# Patient Record
Sex: MALE | Race: WHITE | Employment: FULL TIME | URBAN - METROPOLITAN AREA
[De-identification: names, ages, dates, MRNs, and addresses within clinical notes are randomized per-mention and may not be internally consistent; named-entity substitution may affect disease eponyms.]

---

## 2020-03-12 ENCOUNTER — NURSE TRIAGE (OUTPATIENT)
Dept: NURSING | Facility: CLINIC | Age: 30
End: 2020-03-12

## 2020-03-12 ENCOUNTER — VIRTUAL VISIT (OUTPATIENT)
Dept: FAMILY MEDICINE | Facility: OTHER | Age: 30
End: 2020-03-12

## 2020-03-12 NOTE — TELEPHONE ENCOUNTER
Angelo is calling about the drive up carona virus test.  All last week had a fever and cold and dry cough.  Angelo is working and is wanting to know if he should quarantine himself.      Additional Information    Negative: Severe difficulty breathing (e.g., struggling for each breath, speaks in single words)    Negative: Bluish (or gray) lips or face now    Negative: [1] Rapid onset of cough AND [2] has hives    Negative: Coughing started suddenly after medicine, an allergic food or bee sting    Negative: [1] Difficulty breathing AND [2] exposure to flames, smoke, or fumes    Negative: [1] Stridor AND [2] difficulty breathing    Negative: Sounds like a life-threatening emergency to the triager    Negative: Patient sounds very sick or weak to the triager    Negative: [1] Coughed up blood AND [2] > 1 tablespoon (15 ml) (Exception: blood-tinged sputum)    Negative: Fever > 103 F (39.4 C)    Negative: [1] Fever > 101 F (38.3 C) AND [2] age > 60    Negative: [1] Fever > 100.0 F (37.8 C) AND [2] bedridden (e.g., nursing home patient, CVA, chronic illness, recovering from surgery)    Negative: [1] Fever > 100.0 F (37.8 C) AND [2] diabetes mellitus or weak immune system (e.g., HIV positive, cancer chemo, splenectomy, chronic steroids)    Negative: Wheezing is present    Negative: [1] Ankle swelling AND [2] swelling is increasing    Negative: SEVERE coughing spells (e.g., whooping sound after coughing, vomiting after coughing)    Negative: [1] Continuous (nonstop) coughing interferes with work or school AND [2] no improvement using cough treatment per protocol    Negative: Fever present > 3 days (72 hours)    Negative: [1] Fever returns after gone for over 24 hours AND [2] symptoms worse or not improved    Negative: [1] Using nasal washes and pain medicine > 24 hours AND [2] sinus pain (around cheekbone or eye) persists    Negative: Earache is present    Negative: Cough has been present for > 3 weeks    Negative: [1] Nasal  discharge AND [2] present > 10 days    Negative: [1] Coughed up blood-tinged sputum AND [2] more than once    Negative: [1] Patient also has allergy symptoms (e.g., itchy eyes, clear nasal discharge, postnasal drip) AND [2] they are acting up    Negative: Taking an ACE Inhibitor medication  (e.g., benazepril/LOTENSIN, captopril/CAPOTEN, enalapril/VASOTEC, lisinopril/ZESTRIL)    Negative: Exposure to TB (Tuberculosis)    Cough    Negative: Severe difficulty breathing (e.g., struggling for each breath, speak in single words, bluish lips)    Negative: Sounds like a life-threatening emergency to the triager    Negative: [1] Difficulty breathing (shortness of breath) occurs AND [2] onset > 14 days after CORONAVIRUS EXPOSURE (Close Contact)    Negative: [1] Dry cough occurs AND [2] onset > 14 days after CORONAVIRUS EXPOSURE    Negative: [1] Wet cough (i.e., white-yellow, yellow, green, or yovany colored sputum) AND [2] onset > 14 days after CORONAVIRUS EXPOSURE    Negative: [1] Common cold symptoms AND [2] onset > 14 days after CORONAVIRUS EXPOSURE    Negative: [1] Difficulty breathing occurs AND [2] within 14 days of CORONAVIRUS EXPOSURE    Negative: Patient sounds very sick or weak to the triager    Negative: [1] Fever or feeling feverish AND [2] within 14 Days of CORONAVIRUS EXPOSURE    Negative: [1] Cough occurs AND [2] within 14 days of CORONAVIRUS EXPOSURE    Negative: [1] Fever or feeling feverish AND [2] symptoms of lower respiratory illness (e.g., cough, difficulty breathing) AND [3] TRAVEL FROM [place] within last 14 days    Negative: [1] Body aches, chills, diarrhea, headache, runny nose, or sore throat occur AND [2] within 14 days of CORONAVIRUS EXPOSURE    Negative: [1] CORONAVIRUS EXPOSURE 15 or more days ago AND [2] NO cough or fever or breathing difficulty    Negative: [1] No CORONAVIRUS EXPOSURE BUT [2] questions about    Protocols used: COUGH - ACUTE NON-PRODUCTIVE-A-AH, CORONAVIRUS (2019-NCOV)  EXPOSURE-A-AH

## 2020-03-16 NOTE — PROGRESS NOTES
"Date: 2020 10:21:59  Clinician: Neyda Hargrove  Clinician NPI: [id number]  Patient: Angelo Alan  Patient : 1990  Patient Address: [address] [place] [id number]  Patient Phone: [phone]  Visit Protocol: URI  Patient Summary:  Angelo is a 30 year old ( : 1990 ) male who initiated a Visit for COVID-19 (Coronavirus) evaluation and screening. When asked the question \"Please sign me up to receive news, health information and promotions. \", Angelo responded \"No\".    Angelo states his symptoms started gradually 10-13 days ago. After his symptoms started, they improved and then got worse again.   His symptoms consist of rhinitis, malaise, a sore throat, a cough, nasal congestion, a headache, and myalgia. He is experiencing difficulty breathing due to nasal congestion but he is not short of breath.   Symptom details     Nasal secretions: The color of his mucus is green, clear, yellow, and white.    Cough: Angelo coughs every 5-10 minutes and his cough is more bothersome at night. Phlegm does not come into his throat when he coughs. He does not believe his cough is caused by post-nasal drip.     Sore throat: Angelo reports having mild throat pain (1-3 on a 10 point pain scale), does not have exudate on his tonsils, and can swallow liquids. He is not sure if the lymph nodes in his neck are enlarged. A rash has not appeared on the skin since the sore throat started.     Headache: He states the headache is mild (1-3 on a 10 point pain scale).      Angelo denies having wheezing, ear pain, fever, teeth pain, chills, and facial pain or pressure. He also denies taking antibiotic medication for the symptoms, having recent facial or sinus surgery in the past 60 days, and having a sinus infection within the past year.   Precipitating events  Angelo is not sure if he has been exposed to someone with strep throat. He has recently been exposed to someone with influenza. Angelo has been in close contact with the " following high risk individuals: children under the age of 5.   Pertinent COVID-19 (Coronavirus) information  Angelo has not traveled internationally in the last 14 days before the start of his symptoms.   Angelo has not had close contact with a laboratory confirmed positive COVID-19 patient within 14 days of symptom onset.   Pertinent medical history  Angelo does not need a return to work/school note.   Weight: 200 lbs   Angelo does not smoke or use smokeless tobacco.   Weight: 200 lbs    MEDICATIONS: Adderall XR oral, ALLERGIES: NKDA  Clinician Response:  Dear Angelo,  Based on the information provided, you have acute bacterial sinusitis, also known as a sinus infection. Sinus infections are caused by bacteria or a virus and symptoms are almost always identical. The difference between the 2 types of infections is timing.  Sinus infections start as viral infections and symptoms improve on their own in about 7 days. If symptoms have not improved after 7 days or have even worsened, a bacterial infection may have developed.  Medication information  I am prescribing:     Amoxicillin-pot clavulanate 875-125 mg oral tablet. Take 1 tablet by mouth every 12 hours for 10 days. There are no refills with this prescription.   Self care  The following tips will keep you as comfortable as possible while you recover:     Rest    Drink plenty of water and other liquids    Take a hot shower to loosen congestion    Use throat lozenges    Gargle with warm salt water (1/4 teaspoon of salt per 8 ounce glass of water)    Suck on frozen items such as popsicles or ice cubes    Drink hot tea with lemon and honey    Take a spoonful of honey to reduce your cough     When to seek care  Please be seen in a clinic or urgent care if any of the following occur:     Symptoms do not start to improve after 3 days of treatment    New symptoms develop, or symptoms become worse     It is possible to have an allergic reaction to an antibiotic even if you have not  had one in the past. If you notice a new rash, significant swelling, or difficulty breathing, stop taking this medication immediately and go to a clinic or urgent care.  Call 911 or go to the emergency room if you feel that your throat is closing off, you suddenly develop a rash, you are unable to swallow fluids, you are drooling, or you are having difficulty breathing.  COVID-19 (Coronavirus) General Information  We understand it may be concerning to be ill with symptoms that overlap with COVID-19 (Coronavirus) symptoms. Below are some helpful information on COVID-19 (Coronavirus).  How can I protect myself and others from the COVID-19 (Coronavirus)?  Because there is currently no vaccine to prevent infection, the best way to protect yourself is to avoid being exposed to this virus. The CDC recommends the following additional steps:     Wash your hands often with soap and water for at least 20 seconds, especially after blowing your nose, coughing, or sneezing; going to the bathroom; and before eating or preparing food.  Use an alcohol-based hand  that contains at least 60 percent alcohol if soap and water are not available.        Avoid touching your eyes, nose and mouth with unwashed hands.    Avoid close contact with people who are sick.    Stay home when you are sick.    Cover your cough or sneeze with a tissue, then throw the tissue in the trash.    Clean and disinfect frequently touched objects and surfaces.     You can help stop COVID-19 (Coronavirus) by knowing the signs and symptoms:     Fever    Cough    Shortness of breath     Contact your healthcare provider if   Develop symptoms   AND   Have been in close contact with a person known to have COVID-19 (Coronavirus) or live in or have recently traveled from an area with ongoing spread of COVID-19 (Coronavirus). Call ahead before you go to a doctor's office or emergency room. Tell them about your recent travel and your symptoms.   For the most up  to date information, visit the CDC's website.  Steps to help prevent the spread of COVID-19 (Coronavirus) if you are sick  If you are sick with COVID-19 (Coronavirus) or suspect you are infected with the virus that causes COVID-19 (Coronavirus), follow the steps below to help prevent the disease from spreading&nbsp;to people in your home and community.     Stay home except to get medical care. Home isolation may be started in consultation with your healthcare clinician.    Separate yourself from other people and animals in your home.    Call ahead before visiting your doctor if you have a medical appointment.    Wear a facemask when you are around other people.    Cover your cough and sneezes.    Clean your hands often.    Avoid sharing personal household items.    Clean and disinfect frequently touched objects and surfaces everyday.    You will need to have someone drop off medications or household supplies (if needed) at your house without coming inside or in contact with you or others living in your house.    Monitor your symptoms and seek prompt medical care if your illness is worsening (e.g. Difficulty breathing).    Discontinue home isolation only in consultation with your healthcare provider.     For more detailed and up to date information on what to do if you are sick, visit this link: What to Do If You Are Sick With Coronavirus Disease 2019 (COVID-19).  Do I need to be tested for COVID-19 (Coronavirus)?     At this time, the limited number of tests available are controlled by the state and local health departments and are being reserved for more seriously ill patients, those with known exposure to confirmed patients, and those with recent travel (within 14 days) to countries with high rates of COVID-19 (Coronavirus).    Decisions on which patients receive testing will be based on the local spread of COVID-19 (Coronavirus) as well as the symptoms. Your healthcare provider will make the final decision on  "whether you should be tested.    In the meantime, if you have concerns that you may have been exposed, it is reasonable to practice \"social distancing.\"&nbsp; If you are ill with a cold or flu like illness, please monitor your symptoms and reach out to your healthcare provider if your symptoms worsen.    For more up to date information, visit this link: COVID-19 (Coronavirus) Frequently Asked Questions and Answers.      Diagnosis: Acute bacterial sinusitis  Diagnosis ICD: J01.90  Prescription: amoxicillin-pot clavulanate 875-125 mg oral tablet 20 tablet, 10 days supply. Take 1 tablet by mouth every 12 hours for 10 days. Refills: 0, Refill as needed: no, Allow substitutions: yes  "

## 2021-02-06 ENCOUNTER — NURSE TRIAGE (OUTPATIENT)
Dept: NURSING | Facility: CLINIC | Age: 31
End: 2021-02-06

## 2021-02-06 NOTE — TELEPHONE ENCOUNTER
Triage Call:    Pt calling with report of current oral fever of 103.1  Says he has had a fever and chills for 1 week, once up to 102.5 orally.   Pt also says he currently has body aches, fatigue, and a frequent non-productive cough.  Denies breathing problems or chest pains.    Pt says he had a covid test 1 week ago that was negative.     Triaged to disposition of See Physician within 24 hours, and care advice given. Pt said he will go to the Urgent Care tomorrow.      COVID 19 Nurse Triage Plan/Patient Instructions    Please be aware that novel coronavirus (COVID-19) may be circulating in the community. If you develop symptoms such as fever, cough, or SOB or if you have concerns about the presence of another infection including coronavirus (COVID-19), please contact your health care provider or visit [email or web address].     Disposition/Instructions    In-Person Visit with provider recommended. Reference Visit Selection Guide.    Thank you for taking steps to prevent the spread of this virus.  o Limit your contact with others.  o Wear a simple mask to cover your cough.  o Wash your hands well and often.    Resources    [facility]: About COVID-19: [email or web address]    CDC: What to Do If You're Sick: www.cdc.gov/coronavirus/2019-ncov/about/steps-when-sick.html    CDC: Ending Home Isolation: www.cdc.gov/coronavirus/2019-ncov/hcp/disposition-in-home-patients.html     CDC: Caring for Someone: www.cdc.gov/coronavirus/2019-ncov/if-you-are-sick/care-for-someone.html     [facility]: Interim Guidance for Hospital Discharge to Home: www.health.[facility].[place]/diseases/coronavirus/hcp/hospdischarge.pdf    [facility] clinical trials (COVID-19 research studies): [email or web address]-trials     Below are the COVID-19 hotlines at the [place] Department of Health ([facility]). Interpreters are available.   o For health questions: Call 633-236-8162 or 1-849.242.8247 (7 a.m. to 7 p.m.)  o For questions  about schools and childcare: Call 953-300-0065 or 1-505.195.4749 (7 a.m. to 7 p.m.)       Joanie Smith RN        Additional Information    Negative: Shock suspected (e.g., cold/pale/clammy skin, too weak to stand, low BP, rapid pulse)    Negative: Difficult to awaken or acting confused (e.g., disoriented, slurred speech)    Negative: [1] Difficulty breathing AND [2] bluish lips, tongue or face    Negative: New onset rash with multiple purple (or blood-colored) spots or dots    Negative: Sounds like a life-threatening emergency to the triager    Negative: Fever in a cancer patient who is currently (or recently) receiving chemotherapy or radiation therapy, or cancer patient who has metastatic or end-stage cancer and is receiving palliative care    Negative: Pregnant    Negative: Postpartum (from 0 to 6 weeks after delivery)    Negative: Fever onset within 24 hours of receiving VACCINE    Negative: [1] Fever AND [2] within 21 days of Ebola EXPOSURE    Negative: Other symptom is present, see that guideline  (e.g., symptoms of cough, runny nose, sore throat, earache, abdominal pain, diarrhea, vomiting)    Negative: [1] Headache AND [2] stiff neck (can't touch chin to chest)    Negative: Difficulty breathing    Negative: IV drug abuse    Negative: [1] Drinking very little AND [2] dehydration suspected (e.g., no urine > 12 hours, very dry mouth, very lightheaded)    Negative: Patient sounds very sick or weak to the triager  (Exception: mild weakness and hasn't taken fever medicine)    Negative: Fever > 104 F (40 C)    Negative: [1] Fever > 101 F (38.3 C) AND [2] age > 60    Negative: [1] Fever > 100.0 F (37.8 C) AND [2] bedridden (e.g., nursing home patient, CVA, chronic illness, recovering from surgery)    Negative: [1] Fever > 100.0 F (37.8 C) AND [2] indwelling urinary catheter (e.g., Haddad, [place]de)    Negative: [1] Fever > 100.0 F (37.8 C) AND [2] has port (portacath), central line, or PICC line    Negative: [1]  Fever > 100.0 F (37.8 C) AND [2] diabetes mellitus or weak immune system (e.g., HIV positive, cancer chemo, splenectomy, organ transplant, chronic steroids)    Negative: [1] Fever > 100.0 F (37.8 C) AND [2] surgery in the last month    Negative: Transplant patient (e.g., kidney, liver, lung, heart)    Fever present > 3 days (72 hours)    Protocols used: FEVER-A-AH

## 2021-02-07 ENCOUNTER — APPOINTMENT (OUTPATIENT)
Dept: GENERAL RADIOLOGY | Facility: CLINIC | Age: 31
End: 2021-02-07
Attending: EMERGENCY MEDICINE
Payer: COMMERCIAL

## 2021-02-07 ENCOUNTER — APPOINTMENT (OUTPATIENT)
Dept: ULTRASOUND IMAGING | Facility: CLINIC | Age: 31
End: 2021-02-07
Attending: EMERGENCY MEDICINE
Payer: COMMERCIAL

## 2021-02-07 ENCOUNTER — HOSPITAL ENCOUNTER (EMERGENCY)
Facility: CLINIC | Age: 31
Discharge: HOME | End: 2021-02-07
Attending: EMERGENCY MEDICINE | Admitting: EMERGENCY MEDICINE
Payer: COMMERCIAL

## 2021-02-07 VITALS
HEART RATE: 110 BPM | DIASTOLIC BLOOD PRESSURE: 71 MMHG | OXYGEN SATURATION: 97 % | TEMPERATURE: 102.1 F | RESPIRATION RATE: 20 BRPM | BODY MASS INDEX: 24.39 KG/M2 | HEIGHT: 74 IN | SYSTOLIC BLOOD PRESSURE: 116 MMHG

## 2021-02-07 DIAGNOSIS — R50.9 FEVER IN ADULT: ICD-10-CM

## 2021-02-07 DIAGNOSIS — M79.10 MYALGIA: ICD-10-CM

## 2021-02-07 LAB
ALBUMIN SERPL-MCNC: 3.5 G/DL (ref 3.4–5)
ALBUMIN UR-MCNC: 30 MG/DL
ALP SERPL-CCNC: 150 U/L (ref 40–150)
ALT SERPL W P-5'-P-CCNC: 287 U/L (ref 0–70)
ANION GAP SERPL CALCULATED.3IONS-SCNC: 6 MMOL/L (ref 3–14)
APPEARANCE UR: CLEAR
AST SERPL W P-5'-P-CCNC: 145 U/L (ref 0–45)
BILIRUB DIRECT SERPL-MCNC: 0.2 MG/DL (ref 0–0.2)
BILIRUB SERPL-MCNC: 0.6 MG/DL (ref 0.2–1.3)
BILIRUB UR QL STRIP: NEGATIVE
BUN SERPL-MCNC: 13 MG/DL (ref 7–30)
CALCIUM SERPL-MCNC: 8.3 MG/DL (ref 8.5–10.1)
CHLORIDE SERPL-SCNC: 104 MMOL/L (ref 94–109)
CO2 SERPL-SCNC: 25 MMOL/L (ref 20–32)
COLOR UR AUTO: YELLOW
CREAT SERPL-MCNC: 1.32 MG/DL (ref 0.66–1.25)
FLUAV RNA RESP QL NAA+PROBE: NEGATIVE
FLUBV RNA RESP QL NAA+PROBE: NEGATIVE
GFR SERPL CREATININE-BSD FRML MDRD: 72 ML/MIN/{1.73_M2}
GLUCOSE SERPL-MCNC: 113 MG/DL (ref 70–99)
GLUCOSE UR STRIP-MCNC: NEGATIVE MG/DL
HETEROPH AB SER QL: NEGATIVE
HGB UR QL STRIP: NEGATIVE
KETONES UR STRIP-MCNC: 5 MG/DL
LABORATORY COMMENT REPORT: NORMAL
LACTATE BLD-SCNC: 1.3 MMOL/L (ref 0.7–2)
LEUKOCYTE ESTERASE UR QL STRIP: NEGATIVE
LIPASE SERPL-CCNC: 112 U/L (ref 73–393)
MUCOUS THREADS #/AREA URNS LPF: PRESENT /LPF
NITRATE UR QL: NEGATIVE
PH UR STRIP: 6 PH (ref 5–7)
POTASSIUM SERPL-SCNC: 3.8 MMOL/L (ref 3.4–5.3)
PROT SERPL-MCNC: 6.9 G/DL (ref 6.8–8.8)
RBC #/AREA URNS AUTO: 0 /HPF (ref 0–2)
RETICS # AUTO: 50.6 10E9/L (ref 25–95)
RETICS/RBC NFR AUTO: 1.1 % (ref 0.5–2)
RSV RNA SPEC QL NAA+PROBE: NORMAL
SARS-COV-2 RNA RESP QL NAA+PROBE: NEGATIVE
SODIUM SERPL-SCNC: 135 MMOL/L (ref 133–144)
SOURCE: ABNORMAL
SP GR UR STRIP: 1.03 (ref 1–1.03)
SPECIMEN SOURCE: NORMAL
SQUAMOUS #/AREA URNS AUTO: <1 /HPF (ref 0–1)
UROBILINOGEN UR STRIP-MCNC: 4 MG/DL (ref 0–2)
WBC #/AREA URNS AUTO: 3 /HPF (ref 0–5)

## 2021-02-07 PROCEDURE — 96360 HYDRATION IV INFUSION INIT: CPT

## 2021-02-07 PROCEDURE — 96361 HYDRATE IV INFUSION ADD-ON: CPT

## 2021-02-07 PROCEDURE — 83690 ASSAY OF LIPASE: CPT | Performed by: EMERGENCY MEDICINE

## 2021-02-07 PROCEDURE — C9803 HOPD COVID-19 SPEC COLLECT: HCPCS

## 2021-02-07 PROCEDURE — 87040 BLOOD CULTURE FOR BACTERIA: CPT | Performed by: EMERGENCY MEDICINE

## 2021-02-07 PROCEDURE — 999N001109 HC STATISTIC MORPHOLOGY W/INTERP HISTOLOGY TC 85060: Performed by: EMERGENCY MEDICINE

## 2021-02-07 PROCEDURE — 83605 ASSAY OF LACTIC ACID: CPT | Performed by: EMERGENCY MEDICINE

## 2021-02-07 PROCEDURE — 80048 BASIC METABOLIC PNL TOTAL CA: CPT | Performed by: EMERGENCY MEDICINE

## 2021-02-07 PROCEDURE — 99285 EMERGENCY DEPT VISIT HI MDM: CPT | Mod: 25

## 2021-02-07 PROCEDURE — 258N000003 HC RX IP 258 OP 636: Performed by: EMERGENCY MEDICINE

## 2021-02-07 PROCEDURE — 87636 SARSCOV2 & INF A&B AMP PRB: CPT | Performed by: EMERGENCY MEDICINE

## 2021-02-07 PROCEDURE — 86665 EPSTEIN-BARR CAPSID VCA: CPT | Performed by: EMERGENCY MEDICINE

## 2021-02-07 PROCEDURE — 71045 X-RAY EXAM CHEST 1 VIEW: CPT

## 2021-02-07 PROCEDURE — 36415 COLL VENOUS BLD VENIPUNCTURE: CPT | Performed by: EMERGENCY MEDICINE

## 2021-02-07 PROCEDURE — 250N000013 HC RX MED GY IP 250 OP 250 PS 637: Performed by: EMERGENCY MEDICINE

## 2021-02-07 PROCEDURE — 81001 URINALYSIS AUTO W/SCOPE: CPT | Performed by: EMERGENCY MEDICINE

## 2021-02-07 PROCEDURE — 80076 HEPATIC FUNCTION PANEL: CPT | Performed by: EMERGENCY MEDICINE

## 2021-02-07 PROCEDURE — 76705 ECHO EXAM OF ABDOMEN: CPT

## 2021-02-07 PROCEDURE — 85045 AUTOMATED RETICULOCYTE COUNT: CPT | Performed by: EMERGENCY MEDICINE

## 2021-02-07 PROCEDURE — 85025 COMPLETE CBC W/AUTO DIFF WBC: CPT | Performed by: EMERGENCY MEDICINE

## 2021-02-07 PROCEDURE — 86308 HETEROPHILE ANTIBODY SCREEN: CPT | Performed by: EMERGENCY MEDICINE

## 2021-02-07 PROCEDURE — 86665 EPSTEIN-BARR CAPSID VCA: CPT | Mod: 91 | Performed by: EMERGENCY MEDICINE

## 2021-02-07 RX ORDER — ACETAMINOPHEN 325 MG/1
650 TABLET ORAL ONCE
Status: COMPLETED | OUTPATIENT
Start: 2021-02-07 | End: 2021-02-07

## 2021-02-07 RX ORDER — FAMOTIDINE 20 MG/1
20 TABLET, FILM COATED ORAL ONCE
Status: COMPLETED | OUTPATIENT
Start: 2021-02-07 | End: 2021-02-07

## 2021-02-07 RX ORDER — SODIUM CHLORIDE 9 MG/ML
INJECTION, SOLUTION INTRAVENOUS CONTINUOUS
Status: DISCONTINUED | OUTPATIENT
Start: 2021-02-07 | End: 2021-02-07 | Stop reason: HOSPADM

## 2021-02-07 RX ADMIN — FAMOTIDINE 20 MG: 20 TABLET ORAL at 12:10

## 2021-02-07 RX ADMIN — ACETAMINOPHEN 650 MG: 325 TABLET, FILM COATED ORAL at 12:10

## 2021-02-07 RX ADMIN — SODIUM CHLORIDE 1000 ML: 9 INJECTION, SOLUTION INTRAVENOUS at 12:11

## 2021-02-07 NOTE — LETTER
February 7, 2021      To Whom It May Concern:      Angelo James was seen in our Emergency Department today, 02/07/21. Please excuse him from work until fevers resolve.      Sincerely,        Bud Jacobson RN

## 2021-02-07 NOTE — ED PROVIDER NOTES
"  History   Chief Complaint:  Fever    HPI   Angelo James is a 30 year old male who presents to the ED for evaluation of fever, body aches, cough. The patient reports for about 9 days he has had persistent fevers, body aches, dry cough.  He has been taking NyQuil in the evenings and ibuprofen throughout the day.  Last ibuprofen was at 630 this morning.  No shortness of breath or rashes or abdominal pain or vomiting or diarrhea or neck stiffness or groin discomfort or concern for STDs.  Patient did have sore throat initially but that has since resolved.  He reports negative Covid testing x2, negative strep testing, negative influenza testing.  He states he was put on a few days of Keflex at an urgent care, but is not sure why.  Patient denies IV drug use or history of prosthetic heart valves.    Review of Systems  A 10 point ROS was obtained and negative except as noted here and in HPI    Allergies:  No known allergies    Medications:    Adderall XR  Keflex    Past Medical History:    ADHD  Depression  Anxiety    Past Surgical History:    The patient denies past surgical history.     Family History:    The patient denies past family history.     Social History:  Marital Status: Single  Employer: [employer]  Smoking status: No  Alcohol use: Yes  Drug use: No  PCP: MARKUS VEGA  Presents to the ED with self    Physical Exam     Patient Vitals for the past 24 hrs:   BP Temp Temp src Pulse Resp SpO2 Height   02/07/21 1039 116/71 102.1  F (38.9  C) Oral 110 20 97 % 1.88 m (6' 2\")       Physical Exam  VS: Reviewed per above  HENT: Mucous membranes moist, no nuchal rigidity. Uvula midline, no tonsillar hypertrophy nor asymmetry. Tolerating secretions, normal phonation.   EYES: sclera anicteric  CV: Rate as noted, regular rhythm.   RESP: Effort normal. Breath sounds are normal bilaterally.  GI: no tenderness/rebound/guarding, not distended.  : No testicular tenderness or rashes.  NEURO: GCS 15, cranial " nerves II through XII are intact, 5 out of 5 strength in all 4 extremities, sensation is intact light touch in all 4 extremities  MSK: No deformity of the extremities  SKIN: Warm and dry, no rashes.    Emergency Department Course   Imaging:  Radiology findings were communicated with the patient who voiced understanding of the findings.    XR Chest, portable, 1 view:  AP view of the chest was obtained. Cardiomediastinal   silhouette is within normal limits. No suspicious focal pulmonary   opacities. No significant pleural effusion or pneumothorax.     US Abdomen limited, RUQ:  Normal right upper quadrant abdominal ultrasound.    Reading per radiology    Laboratory:  Laboratory findings were communicated with the patient who voiced understanding of the findings.    CBC: WBC: 5.5, HGB: 13.1 (L), PLT: 163    BMP: Creatinine 1.32 (H), Glc 113 (H), Ca 8.3 (L) o/w WNL    UA: Ketones: 5, Protein Albumin: 30, Urobilinogen: 4.0 (L), Mucous: Present, o/w negative    Hepatic panel:  (H),  (H) o/w WNL    Mononucleosis screen: Negative    Lactic Acid (1128): 1.3    Lipase: 112    Reticulocyte count: 1.1    EBV capsid antibody IgM: Pending  EBV capsid antibody IgG: Pending    Blood Morphology pathologist review: Pending    Symptomatic Influenza A/B: Negative  Symptomatic COVID-19 Virus PCR: Negative    Blood Culture x2: Pending    Emergency Department Course:    Reviewed:  I reviewed the patient's nursing notes, vitals, past medical records, Care Everywhere.     Assessments:  1100 I first assessed the patient and performed an exam. Discussed plans for care.    1205 I rechecked the patient and updated them on their results.    Interventions:  1210: Tylenol 650 mg PO  1210: Pepcid 20 mg PO  1211: NS 1L IV Bolus     Disposition:  The patient was discharged to home.       Impression & Plan    Covid-19  Angelo James was evaluated during a global COVID-19 pandemic, which necessitated consideration that the patient  might be at risk for infection with the SARS-CoV-2 virus that causes COVID-19.   Applicable protocols for evaluation were followed during the patient's care.   COVID-19 was considered as part of the patient's evaluation. The plan for testing is:  a test was obtained during this visit.    Medical Decision Making:   Angelo James is a 30 year old male who presents with fever of unknown origin and myalgias and dry cough for about 9 days.  On arrival temperature is 102.1  F.  Patient is well-appearing without clinical signs of meningitis, PTA, RPA, epiglottitis, skin or soft tissue infection.  Abdomen is soft and nontender without peritoneal signs to suggest sinister surgical intra-abdominal process.  Chest x-ray does not suggest pneumonia.  No evidence of UTI.  Mono spot testing is negative.  Labs are without evidence of sepsis.  Covid and influenza testing again today are also negative.  Right upper quadrant ultrasound obtained due to mild transaminitis does not show evidence of cholecystitis or cholangitis.  No recent travel to suggest atypical infectious pathogen.  I was notified by lab that the CBC differential appeared abnormal and a blood morphology test was recommended.  At discharge, this study as well as blood cultures were pending.  Patient understands to follow-up with primary care in the next couple days for follow-up of the studies and reassessment of symptoms.  Return precautions were discussed prior to discharge.    Diagnosis:     ICD-10-CM    1. Fever in adult  R50.9    2. Myalgia  M79.10        Disposition:   Discharged to home.    Scribe Disclosure:  Brandy JAVIER, am serving as a scribe on 2/7/2021 at 11:00 AM to personally document services performed by Jeremi Vasquez MD based on my observations and the provider's statements to me.           Jeremi Vasquez MD  02/07/21 0551

## 2021-02-07 NOTE — ED TRIAGE NOTES
Fever for the past 9 days. States Covid test neg x2 with the last one being yesterday. Neg as well for influenza and strep.

## 2021-02-08 LAB
COPATH REPORT: NORMAL
EBV VCA IGG SER QL IA: >8 AI (ref 0–0.8)
EBV VCA IGM SER QL IA: 3.9 AI (ref 0–0.8)

## 2021-02-09 ENCOUNTER — NURSE TRIAGE (OUTPATIENT)
Dept: NURSING | Facility: CLINIC | Age: 31
End: 2021-02-09

## 2021-02-09 NOTE — TELEPHONE ENCOUNTER
Angelo had some blood in his vomit. It looked like phlegm with blood. He has been coughing a lot so suspect it is due to that. If he has continued blood he will seek care but for now he will wait and watch.     Additional Information    Vomiting     Some blood with phlegm. Will take action if more blood in vomit.    Protocols used: VOMITING-A-OH

## 2021-02-10 ENCOUNTER — HOSPITAL ENCOUNTER (OUTPATIENT)
Facility: CLINIC | Age: 31
Setting detail: OBSERVATION
Discharge: HOME | End: 2021-02-13
Attending: EMERGENCY MEDICINE | Admitting: STUDENT IN AN ORGANIZED HEALTH CARE EDUCATION/TRAINING PROGRAM
Payer: COMMERCIAL

## 2021-02-10 DIAGNOSIS — B17.8 EBV HEPATITIS: ICD-10-CM

## 2021-02-10 DIAGNOSIS — B27.90 EPSTEIN BARR VIRUS INFECTION: Primary | ICD-10-CM

## 2021-02-10 DIAGNOSIS — B27.09 EBV HEPATITIS: ICD-10-CM

## 2021-02-10 LAB
ALBUMIN SERPL-MCNC: 3.5 G/DL (ref 3.4–5)
ALP SERPL-CCNC: 235 U/L (ref 40–150)
ALT SERPL W P-5'-P-CCNC: 1234 U/L (ref 0–70)
ANION GAP SERPL CALCULATED.3IONS-SCNC: 7 MMOL/L (ref 3–14)
AST SERPL W P-5'-P-CCNC: 786 U/L (ref 0–45)
BASOPHILS # BLD AUTO: 0 10E9/L (ref 0–0.2)
BASOPHILS # BLD AUTO: 0.1 10E9/L (ref 0–0.2)
BASOPHILS NFR BLD AUTO: 0 %
BASOPHILS NFR BLD AUTO: 1 %
BILIRUB SERPL-MCNC: 1 MG/DL (ref 0.2–1.3)
BUN SERPL-MCNC: 16 MG/DL (ref 7–30)
CALCIUM SERPL-MCNC: 8.6 MG/DL (ref 8.5–10.1)
CHLORIDE SERPL-SCNC: 102 MMOL/L (ref 94–109)
CO2 SERPL-SCNC: 26 MMOL/L (ref 20–32)
CREAT SERPL-MCNC: 1.1 MG/DL (ref 0.66–1.25)
DIFFERENTIAL METHOD BLD: ABNORMAL
DIFFERENTIAL METHOD BLD: ABNORMAL
EOSINOPHIL # BLD AUTO: 0 10E9/L (ref 0–0.7)
EOSINOPHIL # BLD AUTO: 0 10E9/L (ref 0–0.7)
EOSINOPHIL NFR BLD AUTO: 0 %
EOSINOPHIL NFR BLD AUTO: 0.6 %
ERYTHROCYTE [DISTWIDTH] IN BLOOD BY AUTOMATED COUNT: 12.6 % (ref 10–15)
ERYTHROCYTE [DISTWIDTH] IN BLOOD BY AUTOMATED COUNT: 12.9 % (ref 10–15)
GFR SERPL CREATININE-BSD FRML MDRD: 90 ML/MIN/{1.73_M2}
GLUCOSE SERPL-MCNC: 107 MG/DL (ref 70–99)
HCT VFR BLD AUTO: 39.3 % (ref 40–53)
HCT VFR BLD AUTO: 39.7 % (ref 40–53)
HGB BLD-MCNC: 12.9 G/DL (ref 13.3–17.7)
HGB BLD-MCNC: 13.1 G/DL (ref 13.3–17.7)
IMM GRANULOCYTES # BLD: 0 10E9/L (ref 0–0.4)
IMM GRANULOCYTES NFR BLD: 0.4 %
LABORATORY COMMENT REPORT: NORMAL
LYMPHOCYTES # BLD AUTO: 2.6 10E9/L (ref 0.8–5.3)
LYMPHOCYTES # BLD AUTO: 3.4 10E9/L (ref 0.8–5.3)
LYMPHOCYTES NFR BLD AUTO: 48 %
LYMPHOCYTES NFR BLD AUTO: 50.3 %
MCH RBC QN AUTO: 29.3 PG (ref 26.5–33)
MCH RBC QN AUTO: 29.4 PG (ref 26.5–33)
MCHC RBC AUTO-ENTMCNC: 32.5 G/DL (ref 31.5–36.5)
MCHC RBC AUTO-ENTMCNC: 33.3 G/DL (ref 31.5–36.5)
MCV RBC AUTO: 88 FL (ref 78–100)
MCV RBC AUTO: 90 FL (ref 78–100)
MONOCYTES # BLD AUTO: 0.3 10E9/L (ref 0–1.3)
MONOCYTES # BLD AUTO: 0.4 10E9/L (ref 0–1.3)
MONOCYTES NFR BLD AUTO: 5.6 %
MONOCYTES NFR BLD AUTO: 6 %
NEUTROPHILS # BLD AUTO: 2.5 10E9/L (ref 1.6–8.3)
NEUTROPHILS # BLD AUTO: 2.9 10E9/L (ref 1.6–8.3)
NEUTROPHILS NFR BLD AUTO: 42.1 %
NEUTROPHILS NFR BLD AUTO: 46 %
NRBC # BLD AUTO: 0 10*3/UL
NRBC BLD AUTO-RTO: 0 /100
PLATELET # BLD AUTO: 163 10E9/L (ref 150–450)
PLATELET # BLD AUTO: 178 10E9/L (ref 150–450)
PLATELET # BLD EST: ABNORMAL 10*3/UL
PLATELET # BLD EST: ABNORMAL 10*3/UL
POTASSIUM SERPL-SCNC: 3.7 MMOL/L (ref 3.4–5.3)
PROT SERPL-MCNC: 7.6 G/DL (ref 6.8–8.8)
RBC # BLD AUTO: 4.39 10E12/L (ref 4.4–5.9)
RBC # BLD AUTO: 4.47 10E12/L (ref 4.4–5.9)
RBC MORPH BLD: ABNORMAL
RBC MORPH BLD: ABNORMAL
SARS-COV-2 RNA RESP QL NAA+PROBE: NEGATIVE
SMUDGE CELLS BLD QL SMEAR: PRESENT
SODIUM SERPL-SCNC: 135 MMOL/L (ref 133–144)
SPECIMEN SOURCE: NORMAL
VARIANT LYMPHS BLD QL SMEAR: PRESENT
VARIANT LYMPHS BLD QL SMEAR: PRESENT
WBC # BLD AUTO: 5.5 10E9/L (ref 4–11)
WBC # BLD AUTO: 6.8 10E9/L (ref 4–11)

## 2021-02-10 PROCEDURE — 250N000011 HC RX IP 250 OP 636: Performed by: EMERGENCY MEDICINE

## 2021-02-10 PROCEDURE — 258N000003 HC RX IP 258 OP 636: Performed by: EMERGENCY MEDICINE

## 2021-02-10 PROCEDURE — 80053 COMPREHEN METABOLIC PANEL: CPT | Performed by: EMERGENCY MEDICINE

## 2021-02-10 PROCEDURE — 99220 PR INITIAL OBSERVATION CARE,LEVEL III: CPT | Performed by: STUDENT IN AN ORGANIZED HEALTH CARE EDUCATION/TRAINING PROGRAM

## 2021-02-10 PROCEDURE — 99285 EMERGENCY DEPT VISIT HI MDM: CPT | Mod: 25

## 2021-02-10 PROCEDURE — C9803 HOPD COVID-19 SPEC COLLECT: HCPCS

## 2021-02-10 PROCEDURE — 87635 SARS-COV-2 COVID-19 AMP PRB: CPT | Performed by: EMERGENCY MEDICINE

## 2021-02-10 PROCEDURE — 96376 TX/PRO/DX INJ SAME DRUG ADON: CPT

## 2021-02-10 PROCEDURE — 85025 COMPLETE CBC W/AUTO DIFF WBC: CPT | Performed by: EMERGENCY MEDICINE

## 2021-02-10 PROCEDURE — G0378 HOSPITAL OBSERVATION PER HR: HCPCS

## 2021-02-10 PROCEDURE — 96375 TX/PRO/DX INJ NEW DRUG ADDON: CPT

## 2021-02-10 PROCEDURE — 96361 HYDRATE IV INFUSION ADD-ON: CPT

## 2021-02-10 PROCEDURE — 80143 DRUG ASSAY ACETAMINOPHEN: CPT | Performed by: EMERGENCY MEDICINE

## 2021-02-10 PROCEDURE — 96374 THER/PROPH/DIAG INJ IV PUSH: CPT

## 2021-02-10 RX ORDER — KETOROLAC TROMETHAMINE 15 MG/ML
15 INJECTION, SOLUTION INTRAMUSCULAR; INTRAVENOUS ONCE
Status: COMPLETED | OUTPATIENT
Start: 2021-02-10 | End: 2021-02-10

## 2021-02-10 RX ORDER — ONDANSETRON 2 MG/ML
4 INJECTION INTRAMUSCULAR; INTRAVENOUS EVERY 30 MIN PRN
Status: DISCONTINUED | OUTPATIENT
Start: 2021-02-10 | End: 2021-02-10

## 2021-02-10 RX ORDER — SODIUM CHLORIDE 9 MG/ML
INJECTION, SOLUTION INTRAVENOUS CONTINUOUS
Status: DISCONTINUED | OUTPATIENT
Start: 2021-02-10 | End: 2021-02-10

## 2021-02-10 RX ORDER — ONDANSETRON 2 MG/ML
4 INJECTION INTRAMUSCULAR; INTRAVENOUS EVERY 6 HOURS PRN
Status: DISCONTINUED | OUTPATIENT
Start: 2021-02-10 | End: 2021-02-13 | Stop reason: HOSPADM

## 2021-02-10 RX ORDER — ONDANSETRON 4 MG/1
4 TABLET, ORALLY DISINTEGRATING ORAL EVERY 6 HOURS PRN
Status: DISCONTINUED | OUTPATIENT
Start: 2021-02-10 | End: 2021-02-13 | Stop reason: HOSPADM

## 2021-02-10 RX ORDER — DEXTROAMPHETAMINE SACCHARATE, AMPHETAMINE ASPARTATE MONOHYDRATE, DEXTROAMPHETAMINE SULFATE AND AMPHETAMINE SULFATE 6.25; 6.25; 6.25; 6.25 MG/1; MG/1; MG/1; MG/1
25 CAPSULE, EXTENDED RELEASE ORAL
COMMUNITY
Start: 2019-10-11

## 2021-02-10 RX ORDER — ACETAMINOPHEN 325 MG/1
650 TABLET ORAL EVERY 6 HOURS PRN
Status: DISCONTINUED | OUTPATIENT
Start: 2021-02-10 | End: 2021-02-11

## 2021-02-10 RX ADMIN — SODIUM CHLORIDE 1000 ML: 9 INJECTION, SOLUTION INTRAVENOUS at 20:23

## 2021-02-10 RX ADMIN — ONDANSETRON 4 MG: 2 INJECTION INTRAMUSCULAR; INTRAVENOUS at 23:14

## 2021-02-10 RX ADMIN — ONDANSETRON 4 MG: 2 INJECTION INTRAMUSCULAR; INTRAVENOUS at 20:24

## 2021-02-10 RX ADMIN — KETOROLAC TROMETHAMINE 15 MG: 15 INJECTION, SOLUTION INTRAMUSCULAR; INTRAVENOUS at 23:14

## 2021-02-10 ASSESSMENT — ENCOUNTER SYMPTOMS
DIARRHEA: 0
BLOOD IN STOOL: 0
VOMITING: 1
WEAKNESS: 1
ABDOMINAL PAIN: 1
CHILLS: 1
DIAPHORESIS: 0
APPETITE CHANGE: 1
FATIGUE: 1
NAUSEA: 1
COUGH: 1
SHORTNESS OF BREATH: 0
FEVER: 1

## 2021-02-10 ASSESSMENT — MIFFLIN-ST. JEOR: SCORE: 1951.02

## 2021-02-11 LAB
ALBUMIN SERPL-MCNC: 2.7 G/DL (ref 3.4–5)
ALP SERPL-CCNC: 214 U/L (ref 40–150)
ALT SERPL W P-5'-P-CCNC: 1043 U/L (ref 0–70)
ANION GAP SERPL CALCULATED.3IONS-SCNC: 6 MMOL/L (ref 3–14)
APAP SERPL-MCNC: 2 MG/L (ref 10–20)
AST SERPL W P-5'-P-CCNC: 582 U/L (ref 0–45)
BILIRUB DIRECT SERPL-MCNC: 0.5 MG/DL (ref 0–0.2)
BILIRUB SERPL-MCNC: 0.8 MG/DL (ref 0.2–1.3)
BUN SERPL-MCNC: 16 MG/DL (ref 7–30)
CALCIUM SERPL-MCNC: 7.7 MG/DL (ref 8.5–10.1)
CHLORIDE SERPL-SCNC: 106 MMOL/L (ref 94–109)
CO2 SERPL-SCNC: 24 MMOL/L (ref 20–32)
CREAT SERPL-MCNC: 1.2 MG/DL (ref 0.66–1.25)
ERYTHROCYTE [DISTWIDTH] IN BLOOD BY AUTOMATED COUNT: 13.2 % (ref 10–15)
FERRITIN SERPL-MCNC: 3304 NG/ML (ref 26–388)
GFR SERPL CREATININE-BSD FRML MDRD: 81 ML/MIN/{1.73_M2}
GLUCOSE SERPL-MCNC: 91 MG/DL (ref 70–99)
HCT VFR BLD AUTO: 34.3 % (ref 40–53)
HEMOCCULT STL QL: NEGATIVE
HGB BLD-MCNC: 11.4 G/DL (ref 13.3–17.7)
INR PPP: 1.38 (ref 0.86–1.14)
IRON SATN MFR SERPL: 17 % (ref 15–46)
IRON SERPL-MCNC: 39 UG/DL (ref 35–180)
MCH RBC QN AUTO: 29.2 PG (ref 26.5–33)
MCHC RBC AUTO-ENTMCNC: 33.2 G/DL (ref 31.5–36.5)
MCV RBC AUTO: 88 FL (ref 78–100)
PLATELET # BLD AUTO: 162 10E9/L (ref 150–450)
POTASSIUM SERPL-SCNC: 3.8 MMOL/L (ref 3.4–5.3)
PROT SERPL-MCNC: 6.2 G/DL (ref 6.8–8.8)
RBC # BLD AUTO: 3.9 10E12/L (ref 4.4–5.9)
SODIUM SERPL-SCNC: 136 MMOL/L (ref 133–144)
TIBC SERPL-MCNC: 224 UG/DL (ref 240–430)
WBC # BLD AUTO: 6 10E9/L (ref 4–11)

## 2021-02-11 PROCEDURE — 258N000003 HC RX IP 258 OP 636: Performed by: INTERNAL MEDICINE

## 2021-02-11 PROCEDURE — 86038 ANTINUCLEAR ANTIBODIES: CPT | Performed by: INTERNAL MEDICINE

## 2021-02-11 PROCEDURE — 85610 PROTHROMBIN TIME: CPT | Performed by: INTERNAL MEDICINE

## 2021-02-11 PROCEDURE — 83516 IMMUNOASSAY NONANTIBODY: CPT | Performed by: INTERNAL MEDICINE

## 2021-02-11 PROCEDURE — 258N000003 HC RX IP 258 OP 636: Performed by: STUDENT IN AN ORGANIZED HEALTH CARE EDUCATION/TRAINING PROGRAM

## 2021-02-11 PROCEDURE — 82728 ASSAY OF FERRITIN: CPT | Performed by: INTERNAL MEDICINE

## 2021-02-11 PROCEDURE — 36415 COLL VENOUS BLD VENIPUNCTURE: CPT | Performed by: INTERNAL MEDICINE

## 2021-02-11 PROCEDURE — 82390 ASSAY OF CERULOPLASMIN: CPT | Performed by: INTERNAL MEDICINE

## 2021-02-11 PROCEDURE — 82784 ASSAY IGA/IGD/IGG/IGM EACH: CPT | Performed by: INTERNAL MEDICINE

## 2021-02-11 PROCEDURE — C9113 INJ PANTOPRAZOLE SODIUM, VIA: HCPCS | Performed by: STUDENT IN AN ORGANIZED HEALTH CARE EDUCATION/TRAINING PROGRAM

## 2021-02-11 PROCEDURE — 82103 ALPHA-1-ANTITRYPSIN TOTAL: CPT | Performed by: INTERNAL MEDICINE

## 2021-02-11 PROCEDURE — 82104 ALPHA-1-ANTITRYPSIN PHENO: CPT | Performed by: INTERNAL MEDICINE

## 2021-02-11 PROCEDURE — 250N000011 HC RX IP 250 OP 636: Performed by: STUDENT IN AN ORGANIZED HEALTH CARE EDUCATION/TRAINING PROGRAM

## 2021-02-11 PROCEDURE — 250N000013 HC RX MED GY IP 250 OP 250 PS 637: Performed by: INTERNAL MEDICINE

## 2021-02-11 PROCEDURE — 85027 COMPLETE CBC AUTOMATED: CPT | Performed by: STUDENT IN AN ORGANIZED HEALTH CARE EDUCATION/TRAINING PROGRAM

## 2021-02-11 PROCEDURE — 99207 PR CDG-CODE CATEGORY CHANGED: CPT | Performed by: INTERNAL MEDICINE

## 2021-02-11 PROCEDURE — 80048 BASIC METABOLIC PNL TOTAL CA: CPT | Performed by: STUDENT IN AN ORGANIZED HEALTH CARE EDUCATION/TRAINING PROGRAM

## 2021-02-11 PROCEDURE — 250N000013 HC RX MED GY IP 250 OP 250 PS 637: Performed by: STUDENT IN AN ORGANIZED HEALTH CARE EDUCATION/TRAINING PROGRAM

## 2021-02-11 PROCEDURE — 87517 HEPATITIS B DNA QUANT: CPT | Performed by: INTERNAL MEDICINE

## 2021-02-11 PROCEDURE — 99225 PR SUBSEQUENT OBSERVATION CARE,LEVEL II: CPT | Performed by: INTERNAL MEDICINE

## 2021-02-11 PROCEDURE — 86256 FLUORESCENT ANTIBODY TITER: CPT | Performed by: INTERNAL MEDICINE

## 2021-02-11 PROCEDURE — 83550 IRON BINDING TEST: CPT | Performed by: INTERNAL MEDICINE

## 2021-02-11 PROCEDURE — 36415 COLL VENOUS BLD VENIPUNCTURE: CPT | Performed by: STUDENT IN AN ORGANIZED HEALTH CARE EDUCATION/TRAINING PROGRAM

## 2021-02-11 PROCEDURE — 96375 TX/PRO/DX INJ NEW DRUG ADDON: CPT

## 2021-02-11 PROCEDURE — 82272 OCCULT BLD FECES 1-3 TESTS: CPT | Performed by: STUDENT IN AN ORGANIZED HEALTH CARE EDUCATION/TRAINING PROGRAM

## 2021-02-11 PROCEDURE — G0378 HOSPITAL OBSERVATION PER HR: HCPCS

## 2021-02-11 PROCEDURE — 80076 HEPATIC FUNCTION PANEL: CPT | Performed by: STUDENT IN AN ORGANIZED HEALTH CARE EDUCATION/TRAINING PROGRAM

## 2021-02-11 PROCEDURE — 83540 ASSAY OF IRON: CPT | Performed by: INTERNAL MEDICINE

## 2021-02-11 PROCEDURE — 96376 TX/PRO/DX INJ SAME DRUG ADON: CPT

## 2021-02-11 RX ORDER — ACETAMINOPHEN 325 MG/1
650 TABLET ORAL EVERY 6 HOURS PRN
Status: DISCONTINUED | OUTPATIENT
Start: 2021-02-11 | End: 2021-02-13 | Stop reason: HOSPADM

## 2021-02-11 RX ORDER — CALCIUM CARBONATE 500 MG/1
500 TABLET, CHEWABLE ORAL DAILY PRN
Status: DISCONTINUED | OUTPATIENT
Start: 2021-02-11 | End: 2021-02-13 | Stop reason: HOSPADM

## 2021-02-11 RX ORDER — IBUPROFEN 600 MG/1
600 TABLET, FILM COATED ORAL EVERY 6 HOURS PRN
Status: DISCONTINUED | OUTPATIENT
Start: 2021-02-11 | End: 2021-02-11

## 2021-02-11 RX ORDER — SODIUM CHLORIDE, SODIUM LACTATE, POTASSIUM CHLORIDE, CALCIUM CHLORIDE 600; 310; 30; 20 MG/100ML; MG/100ML; MG/100ML; MG/100ML
INJECTION, SOLUTION INTRAVENOUS CONTINUOUS
Status: DISCONTINUED | OUTPATIENT
Start: 2021-02-11 | End: 2021-02-12

## 2021-02-11 RX ORDER — NALOXONE HYDROCHLORIDE 0.4 MG/ML
0.2 INJECTION, SOLUTION INTRAMUSCULAR; INTRAVENOUS; SUBCUTANEOUS
Status: DISCONTINUED | OUTPATIENT
Start: 2021-02-11 | End: 2021-02-13 | Stop reason: HOSPADM

## 2021-02-11 RX ORDER — OXYCODONE HYDROCHLORIDE 5 MG/1
5 TABLET ORAL EVERY 4 HOURS PRN
Status: DISCONTINUED | OUTPATIENT
Start: 2021-02-11 | End: 2021-02-13 | Stop reason: HOSPADM

## 2021-02-11 RX ORDER — NALOXONE HYDROCHLORIDE 0.4 MG/ML
0.4 INJECTION, SOLUTION INTRAMUSCULAR; INTRAVENOUS; SUBCUTANEOUS
Status: DISCONTINUED | OUTPATIENT
Start: 2021-02-11 | End: 2021-02-13 | Stop reason: HOSPADM

## 2021-02-11 RX ORDER — LANOLIN ALCOHOL/MO/W.PET/CERES
3 CREAM (GRAM) TOPICAL
Status: DISCONTINUED | OUTPATIENT
Start: 2021-02-11 | End: 2021-02-13 | Stop reason: HOSPADM

## 2021-02-11 RX ADMIN — SODIUM CHLORIDE, POTASSIUM CHLORIDE, SODIUM LACTATE AND CALCIUM CHLORIDE: 600; 310; 30; 20 INJECTION, SOLUTION INTRAVENOUS at 01:00

## 2021-02-11 RX ADMIN — SODIUM CHLORIDE, POTASSIUM CHLORIDE, SODIUM LACTATE AND CALCIUM CHLORIDE: 600; 310; 30; 20 INJECTION, SOLUTION INTRAVENOUS at 10:41

## 2021-02-11 RX ADMIN — ACETAMINOPHEN 650 MG: 325 TABLET, FILM COATED ORAL at 12:08

## 2021-02-11 RX ADMIN — GUAIFENESIN 10 ML: 200 SOLUTION ORAL at 17:55

## 2021-02-11 RX ADMIN — PANTOPRAZOLE SODIUM 40 MG: 40 INJECTION, POWDER, FOR SOLUTION INTRAVENOUS at 01:00

## 2021-02-11 RX ADMIN — GUAIFENESIN 10 ML: 200 SOLUTION ORAL at 12:41

## 2021-02-11 RX ADMIN — ACETAMINOPHEN 650 MG: 325 TABLET, FILM COATED ORAL at 04:50

## 2021-02-11 RX ADMIN — OXYCODONE HYDROCHLORIDE 5 MG: 5 TABLET ORAL at 22:41

## 2021-02-11 RX ADMIN — ACETAMINOPHEN 650 MG: 325 TABLET, FILM COATED ORAL at 17:51

## 2021-02-11 RX ADMIN — SODIUM CHLORIDE 500 ML: 9 INJECTION, SOLUTION INTRAVENOUS at 16:36

## 2021-02-11 RX ADMIN — PANTOPRAZOLE SODIUM 40 MG: 40 INJECTION, POWDER, FOR SOLUTION INTRAVENOUS at 08:27

## 2021-02-11 NOTE — PROGRESS NOTES
RECEIVING UNIT ED HANDOFF REVIEW    ED Nurse Handoff Report was reviewed by: Faith Amaya RN on February 10, 2021 at 11:23 PM

## 2021-02-11 NOTE — PROGRESS NOTES
Observation goals PRIOR TO DISCHARGE       -diagnostic tests and consults completed and resulted: Met.  GI consulted. Seen by Dr. Ty this morning.  Anticipating new lab orders.   -vital signs normal or at patient baseline: partially met.  Patient with low grade temp of 99.5 orally.   -tolerating oral intake to maintain hydration: met at this time.    -adequate pain control on oral analgesics: partially met. Still assessing.   -infection is improving: not met.  ALT and AST levels elevated.   -returns to baseline functional status: Met.  Up IND to the bathroom.   -safe disposition plan has been identified: Partially met.  Plans to discharge to home once medically cleared.

## 2021-02-11 NOTE — PROGRESS NOTES
Observation goals PRIOR TO DISCHARGE     Comments:   -diagnostic tests and consults completed and resulted: not met.  GI consulted.   -vital signs normal or at patient baseline: partially met.  Patient with complaints of mild back of head/neck pain.   -tolerating oral intake to maintain hydration: met at this time.    -adequate pain control on oral analgesics: partially met. Still assessing.   -infection is improving: not met.  ALT and AST levels elevated.   -returns to baseline functional status: Partially met.  Patient inquired if it would be okay to get out of bed and move around the room.    -safe disposition plan has been identified: Partially met.  Plans to discharge to home once medically cleared.

## 2021-02-11 NOTE — PROVIDER NOTIFICATION
MD Notification    Notified Person: MD    Notified Person Name: Dr Hamlin    Notification Date/Time: 2/11/21 @0017    Notification Interaction: [employer]    Purpose of Notification: FYI pt states he had one black stool yesterday    Orders Received: occult blood stool ordered    Comments:

## 2021-02-11 NOTE — H&P
[facility]    History and Physical - Hospitalist Service       Date of Admission:  2/10/2021    Assessment & Plan   Angelo James is a 30 year old previously healthy male admitted on 2/10/2021 with infectious mononucleosis and transaminase elevation.     Infectious Mononucleosis   Pt has had now 4 visits for persistent fever, chills, fatigue, generalized weakness, cough, nausea, and vomiting. He had extensive work-up for this and ultimately had positive EBV capsid antibody IgG and IgM, suggesting recent/current EBV infection.   - Supportive therapies with fluids, pain control and anti-emetics     Acute hepatitis   He does have notable transaminase elevation with ALT of 1,234 and AST of 786. Mild transaminse elevation is quite common in infectious mononucleosis - though I'm not sure that it typically causes elevation to this degree. He has been taking tylenol regularly for fevers, but is quite adamant that he has not taken more than the recommended amount. He had a RUQ abdominal US on 2/7 that was unremarkable. He also had a CT CAP on 2/8 that did not show any abnormality of the liver. He denies any recent alcohol use.   - Check tylenol level now, Last dose was around 12 pm 2/10.    - AM LFTs   - Will consult GI to see if any additional work-up in indicated given degree of transaminase elevation.     Blood streaked sputum  Possible melena   Mild anemia   Pt reports frequent gagging, with blood streaked sputum production, and reported having a black stool yesterday. This in the setting of regular NSAID use. He does have slightly low hgb of 12.9.   - He did receive ketorolac in the ED- will avoid additional NSAID use until we can assure that he is not having a GI bleed   - Stool occult blood ordered   - IV Protonix     Diet: Regular Diet Adult  DVT Prophylaxis: Low Risk/Ambulatory with no VTE prophylaxis indicated  Haddad Catheter: not present  Code Status: Full Code          Disposition  Plan   Expected discharge: Tomorrow, recommended to prior living arrangement once adequate pain management/ tolerating PO medications and GI consult .  Entered: Shawanda Hamlin MD 02/10/2021, 10:27 PM     The patient's care was discussed with the Bedside Nurse and Patient.    Shawanda Hamlin MD  [facility]  Contact information available via [facility] Paging/Directory      ______________________________________________________________________    Chief Complaint   Fever, chills, body aches, abdominal pain, nausea.     History is obtained from the patient    History of Present Illness   Angelo James is a 30 year old male who presents with multiple symptoms including fever, chills, body aches, sore throat, abdominal pain, nausea and vomiting.     Pt initially evaluated in urgent care on 2/2/2021 with fever, body aches, and sore throat. He had a covid test prior to that which was negative. He was diagnosed with pharyngitis and prescribed a course of keflex.     She then presented to the ED on 2/7/2021 with on-going symptoms. During this visit he was febrile and tachycardic. CXR was obtained which was normal. He was found to have a mildly elevated creatinine to 1.32, He had mildly elevated transaminases with ALT of 287, and AST of 145. RUQ US was normal. Repeat COVID test was negative. Influenza was negative as well. Mono screen was negative at this time, EBV antibody testing was ordered, but not resulted at the time of discharge. He was discharged home from the ED.     He returned to urgent care on 2/8/2021 with persistent fevers. At this time he had a CT chest, abdomen, pelvis which showed a very small amount of free fluid in the lower abdomen and pelvis which was non-specific. There was not other abnormality noted.     Subsequently his EBV capsid antibody IgG and IgM testing came back positive suggesting current/recent EBV infection.     Today he called the nursing line and reported  that he started vomiting blood. He was instructed to present to the ED again.     Here, he is hemodynamically stable but febrile to 101. BMP and CBC are unremarkable. He did have worsening LFTs with ALT of 1,234, and AST of 786.     He is being admitted to observation for symptomatic treatment of mononucleosis.     He is still feeling quite unwell. He does think the anti-emetics are helping, and is going to try to eat and drink something, but he reports that every time he tries to eat or drink he feels very nauseated and wants to try to vomit. He has noted some blood streaked phlegm and one black stool yesterday.     He has been taking tylenol regularly for his fevers but reports not taking more than the recommended amount on the bottle. He has also been taking intermittent NSAIDs, but hasn't used these in a few days.     Review of Systems    The 10 point Review of Systems is negative other than noted in the HPI or here.     Past Medical History    I have reviewed this patient's medical history and updated it with pertinent information if needed.   History reviewed. No pertinent past medical history.   - No significant past medical history     Past Surgical History   I have reviewed this patient's surgical history and updated it with pertinent information if needed.  History reviewed. No pertinent surgical history.    Social History   I have reviewed this patient's social history and updated it with pertinent information if needed.  Social History     Tobacco Use     Smoking status: Never Smoker     Smokeless tobacco: Never Used   Substance Use Topics     Alcohol use: Yes     Comment: 1 day per week 4 drinks     Drug use: No       Family History   Reviewed in care everywhere, non-contributory     Prior to Admission Medications   Prior to Admission Medications   Prescriptions Last Dose Informant Patient Reported? Taking?   amphetamine-dextroamphetamine (ADDERALL XR) 25 MG 24 hr capsule Past Month at Unknown time Self  Yes Yes   Sig: Take 25 mg by mouth      Facility-Administered Medications: None     Allergies   No Known Allergies    Physical Exam   Vital Signs: Temp: 101  F (38.3  C) Temp src: Oral BP: 116/77 Pulse: 89   Resp: 12 SpO2: 98 % O2 Device: None (Room air)    Weight: 0 lbs 0 oz    General Appearance: awake, alert, NAD  Eyes: pupils equal and round, EOMI, sclera white   Respiratory: No increased work of breathing   Cardiovascular: RRR   GI: soft, non-distended, slightly tender to palpation in the epigastrium  Lymph/Hematologic: small mobile cervical lymph nodes are palpable   Skin: no rashes or lesions noted   Musculoskeletal: No MSK deformities   Neurologic: CN II-XII intact, moves all extremities   Psychiatric: pleasant  Mood and affect     Data   Data reviewed today: I reviewed all medications, new labs and imaging results over the last 24 hours.     Recent Labs   Lab 02/10/21  2019 02/07/21  1107   WBC 6.8 5.5   HGB 12.9* 13.1*   MCV 90 88    163    135   POTASSIUM 3.7 3.8   CHLORIDE 102 104   CO2 26 25   BUN 16 13   CR 1.10 1.32*   ANIONGAP 7 6   AME 8.6 8.3*   * 113*   ALBUMIN 3.5 3.5   PROTTOTAL 7.6 6.9   BILITOTAL 1.0 0.6   ALKPHOS 235* 150   ALT 1,234* 287*   * 145*   LIPASE  --  112     No results found for this or any previous visit (from the past 24 hour(s)).

## 2021-02-11 NOTE — ED NOTES
DATE:  2/10/2021   TIME OF RECEIPT FROM LAB:  2110   LAB TEST:  AST  LAB VALUE:  786  RESULTS GIVEN WITH READ-BACK TO (PROVIDER):  Cammie Luther MD  TIME LAB VALUE REPORTED TO PROVIDER:   2113

## 2021-02-11 NOTE — ED TRIAGE NOTES
Pt reports tested positive for zoey barr virus on Sunday. Pt states unable to eat, nauseated and throwing up. Some blood in vomit

## 2021-02-11 NOTE — CONSULTS
GASTROENTEROLOGY CONSULTATION      Angelo James  [address]  [facility] [address]  30 year old male     Admission Date/Time: 2/10/2021  Primary Care Provider: Abisai Cavazos     We were asked to see the patient in consultation by Dr. Rasmussen for evaluation of elevated liver enzymes.    ASSESSMENT:    1. Elevated liver enzymes - 2/7-2/10-2/11 (-[id number]; -[phone]), total bilirubin normal, INR not checked.  Normal right upper quadrant ultrasound and liver on CT.  Suspect the elevation in liver enzymes is secondary to his EBV infection.  Although mild increased liver enzymes are often seen with EBV, 5-10 times upper limits of normal increases can be seen in frequently with EBV.  Occasionally autoimmune hepatitis can be associated with EBV infection, thus we will check autoimmune markers.  He strictly does not report acetaminophen use greater than the recommended dosage and denies any other toxin use.  It is a good sign that the liver enzymes are decreasing, because rarely patients can progress to overt liver failure which has a high mortality rate.  With the decreasing liver enzymes and normal bilirubin, this seems less likely at this point.  2.  Blood-streaked sputum-this occurred after multiple episodes of dry heaves or gagging.  There was no significant hematemesis.  On further questioning he reports that his stool was dark brown and not black yesterday.  His Hemoccult is negative.  Hemoglobin is only mildly decreased, which could be IV fluids.  We will monitor for any evidence of GI bleeding at this point, no further endoscopic intervention needed unless there is overt GI bleeding  3. Hepatitis BE antigen positive - the remainder of the hep B serology - HBsAg, HBsAb, HBcAb are negative, question if this is a false positive, reviewed with [facility] Liver service today, will check HBV level to make sure it is negative.     RECOMMENDATIONS:  1. Autoimmune markers and extended serology  for increased LFTs  2. HBV level  3. Monitor for overt GI bleeding    Marv Ty MD   [facility]  [phone]      ________________________________________________________________________        HPI:  Angelo James is a 30 year old male who is previously healthy.  He presented to an urgent care on February 2, 2021 with fever, body aches and sore throat.  He was diagnosed with a pharyngitis and prescribed Keflex.  He presented to the emergency room on February 7, 2021 with ongoing fever.  He reports that his temperatures were between 102-103.9.  His transaminases were elevated at the time.  Covid testing negative.  Influenza negative.  Mono screen negative.  His EBV antibody was ordered and ultimately was positive.  He did return to an urgent care on February 8, 2021 with ongoing fevers and a chest, abdomen and pelvis CT scan was done, see results below.  Finally, he was admitted yesterday with ongoing fevers, nausea, dry heaves after which she reports some blood streaked sputum, but no significant hematemesis.  Initially described possibly black stools, on further questioning reports that which is dark brown and not completely black.  There is no red stools.  No abdominal pain.  His liver enzymes were significantly elevated and have slightly improved today.    He reports he was taking the recommended dosage of Tylenol, no overdose.  He had 2 tablets, every 7 hours.  The previous week he was taking ibuprofen 2 tablets every 6 hours, but his stomach was upset, thus he stopped taking this.  At baseline, he drinks alcohol once a week, 4 beers at a time.  No IV drug use.  He does report having an upper endoscopy 5 years ago, results not available.  No other toxin ingestion.     ROS: A comprehensive ten point review of systems was negative aside from those in mentioned in the HPI.      PAST MEDICAL HISTORY:  History reviewed. No pertinent past medical history.  SOCIAL HISTORY:  Social History  "    Tobacco Use     Smoking status: Never Smoker     Smokeless tobacco: Never Used   Substance Use Topics     Alcohol use: Yes     Comment: 1 day per week 4 drinks     Drug use: No     FAMILY HISTORY:  History reviewed. No pertinent family history.  ALLERGIES: No Known Allergies  MEDICATIONS:   Prior to Admission medications    Medication Sig Start Date End Date Taking? Authorizing Provider   amphetamine-dextroamphetamine (ADDERALL XR) 25 MG 24 hr capsule Take 25 mg by mouth 10/11/19  Yes Reported, Patient     PHYSICAL EXAM:   /70 (BP Location: Right arm)   Pulse 86   Temp 99.5  F (37.5  C) (Oral)   Resp 18   Ht 1.88 m (6' 2\")   Wt 91.6 kg (202 lb)   SpO2 98%   BMI 25.94 kg/m     GEN: Alert, oriented x3, NAD.  ROBIN: AT, anicteric, OP without erythema, exudate, or ulcers.    NECK: Supple.    LYMPH: No LAD noted.  HRT: RRR, no MAGNO  LUNGS: CTA  ABD: +BS, non-tender, non-distended, no rebound or guarding.  SKIN: No rash, jaundice   NEURO: MS intact       ADDITIONAL DATA:   I reviewed the patient's new clinical lab test results.   Recent Labs   Lab Test 02/11/21  0619 02/10/21  2019 02/07/21  1107   WBC 6.0 6.8 5.5   HGB 11.4* 12.9* 13.1*   MCV 88 90 88    178 163     Recent Labs   Lab Test 02/11/21  0619 02/10/21  2019 02/07/21  1107    135 135   POTASSIUM 3.8 3.7 3.8   CHLORIDE 106 102 104   CO2 24 26 25   BUN 16 16 13   CR 1.20 1.10 1.32*   ANIONGAP 6 7 6   AME 7.7* 8.6 8.3*   GLC 91 107* 113*     Recent Labs   Lab Test 02/11/21  0619 02/10/21  2019 02/07/21  1356 02/07/21  1107 07/04/16  1857   ALBUMIN 2.7* 3.5  --  3.5 3.8   BILITOTAL 0.8 1.0  --  0.6 0.6   ALT 1,043* 1,234*  --  287* 23   * 786*  --  145* 16   PROTEIN  --   --  30*  --   --    LIPASE  --   --   --  112 96        I reviewed the patient's new imaging results.     US ABDOMEN LIMITED   2/7/2021 12:41 PM      HISTORY: Transaminitis, fever unknown origin.       COMPARISON: None available     FINDINGS:    Gallbladder: " Normal with no cholelithiasis, wall thickening or focal  tenderness.       Bile ducts:  CHD is normal diameter.  No intrahepatic biliary  dilatation. The distal portion of the common bile duct is obscured by  overlying bowel gas.     Liver: Demonstrates normal parenchymal echogenicity. No focal hepatic  mass visualized.     Pancreas:  Partially obscured by overlying bowel gas,  but grossly  unremarkable.      Right kidney:  No hydronephrosis or shadowing calculi.     Aorta and IVC:  Not specifically assessed.                                                                       IMPRESSION:  Normal right upper quadrant abdominal ultrasound.    EXAM: CT CHEST ABDOMEN PELVIS W  LOCATION: [facility]  DATE/TIME: 2/8/2021 10:41 AM    INDICATION: Fever, Unknown Origin  COMPARISON: None.  TECHNIQUE: CT scan of the chest, abdomen, and pelvis was performed following injection of IV contrast. Multiplanar reformats were obtained. Dose reduction techniques were used.   CONTRAST: Omnipaque 350 109 ml    FINDINGS:   LUNGS AND PLEURA: Mild bibasilar atelectasis. Lungs are otherwise clear.    MEDIASTINUM/AXILLAE: Heart is normal in size. No adenopathy.    HEPATOBILIARY: Liver and gallbladder within normal limits.    PANCREAS: Normal.    SPLEEN: Normal.    ADRENAL GLANDS: Normal.    KIDNEYS/BLADDER: Normal.    BOWEL: Bowel within normal limits.    LYMPH NODES: Normal.    VASCULATURE: Unremarkable.    PELVIC ORGANS: Bladder within normal limits. Trace free fluid noted within the lower abdomen and pelvis which is nonspecific.    MUSCULOSKELETAL: Normal.    IMPRESSION:  1.  No CT explanation for fever within the chest, abdomen, and pelvis.  2.  Very small free fluid noted within the lower abdomen and pelvis which is nonspecific. May be related to enteritis or peritonitis. No abscess. No drainable fluid.

## 2021-02-11 NOTE — ED PROVIDER NOTES
History   Chief Complaint:  Nausea and Vomiting    HPI   Angelo James is a 30 year old male, with recent diagnosis of EBV on 2/8/21 (two days ago) after having multiple negative work ups including COVID-19 testing, influenza, imaging, and chest imaging as well. The patient had been having constant fevers for the past 12 days along with fatigue, generalized weakness, cough, nausea, and vomiting. He was given an antiemetic by his PCP yesterday, but has not had time to pick it up. Along with his continued symptoms above, the patient comes in today for evaluation of nausea and vomiting. The patient notes he has had continual nausea and vomiting over the past couple of days and recently began to notice streaks of blood as well. He notes he has not been able to tolerate solids or liquids as he is constantly dry heaving or will vomit instantly after drinking. He contacted his nurse line yesterday and told them about the hematemesis, but was instructed to keep trying to push fluids as he had mucous building up and little in his stomach, so that's why there was probably blood. However, after his symptoms continued and worsened he came in today for evaluation. He says he has had a constant cough throughout, but has not had any production to his knowledge. He states he has also been having some chills as well. He denies chest pain, shortness of breath, diaphoresis, diarrhea, bloody stools, or any other acute symptoms.     Allergies:  No known drug allergies    Medications:    Adderall  Zofran    Past Medical History:    ADHD  Depression with anxiety    Past Surgical History:    The patient denies past surgical history.     Family History:    The patient denies past family history.     Social History:  PCP: MARKUS VEGA  Presents to the ED alone  Marital Status:  Single [1]    Review of Systems   Constitutional: Positive for appetite change, chills, fatigue and fever. Negative for diaphoresis.   Respiratory:  "Positive for cough. Negative for shortness of breath.    Cardiovascular: Negative for chest pain.   Gastrointestinal: Positive for abdominal pain, nausea and vomiting. Negative for blood in stool and diarrhea.   Neurological: Positive for weakness.   10 point review of systems performed and is negative except as above and in HPI.    Physical Exam     Patient Vitals for the past 24 hrs:   BP Temp Temp src Pulse Resp SpO2 Height   02/10/21 2130 118/77 -- -- 88 15 97 % --   02/10/21 2100 114/77 -- -- 88 15 96 % --   02/10/21 2015 120/81 -- -- 87 14 98 % --   02/10/21 1838 118/73 97.2  F (36.2  C) Oral 90 22 97 % 1.88 m (6' 2\")       Physical Exam  General: Resting on the gurney, appears moderately uncomfortable  Head:  The scalp, face, and head appear normal  Mouth/Throat: Mucus membranes are moist  CV:  Regular rate    Normal S1 and S2  No pathological murmur   Resp:  Breath sounds clear and equal bilaterally    Non-labored, no retractions or accessory muscle use    No coarseness    No wheezing   GI:  Abdomen is soft, no rigidity    Slightly palpable hepatomegaly. No palpable splenomegaly. No guarding. No rebound.   MS:  Normal motor assessment of all extremities.    Good capillary refill noted.  Skin:  No rash or lesions noted.  Neuro:   Speech is normal and fluent. No apparent deficit.  Psych: Awake. Alert.  Normal affect.      Appropriate interactions.    Emergency Department Course   Laboratory:    CBC: HGB 12.9 (L), o/w WNL (WBC 6.8, )    CMP:  (H), ALKPHOS 235 (H), ALT 1,234 (HH),  (HH), o/w WNL (Creatinine 1.10)    COVID-19 Virus PCR: Pending    Interventions:  2023: NS 1L IV Bolus   2024: Zofran 4 mg IV    Emergency Department Course:  Reviewed:  I reviewed the patient's nursing notes, vitals, and available past medical records.     Assessments:  2004: I assessed the patient and performed an exam as detailed above.    2154: I rechecked the patient. Explained findings to patient. The " patient is not feeling better after the above interventions and would like to be admitted.     Consults:  2201 I discussed the patient with the admitting hospitalist, Dr. Hamlin.    Disposition:  Admitted.    Impression & Plan   Medical Decision Making:  Angelo James is a 30 year old male who comes in with fever, pharyngitis, nausea, vomiting, and malaise. The patient had previous negative strep, COVID, influenza, amongst other testing, but had positive mono result on 2/8/21. The patient's symptoms are all consistent with infectious mononucleosis. However, he has been seen a total of four times over the past week and is not tolerating oral intake and cannot tolerate antiemetics at home. Here, on laboratory evaluation, the patient has a new severe transaminitis .Zofran was given nausea and he was given IVF. Given the patient's new findings and failure to tolerate outpatient management, he will be brought in for evaluation and management of his symptoms.      Covid-19  Angelo James was evaluated during a global COVID-19 pandemic, which necessitated consideration that the patient might be at risk for infection with the SARS-CoV-2 virus that causes COVID-19.   Applicable protocols for evaluation were followed during the patient's care.   COVID-19 was considered as part of the patient's evaluation. The plan for testing is:  a test was obtained during this visit.    Diagnosis:    ICD-10-CM    1. EBV hepatitis  B27.09     B17.8      Disposition:  Admitted to an observation bed.     Scribe Disclosure:  Vicente JAVIER, am serving as a scribe at 8:04 PM on 2/10/2021 to document services personally performed by Cammie Luther MD based on my observations and the provider's statements to me.      Cammie Luther MD  02/11/21 0004

## 2021-02-11 NOTE — PROGRESS NOTES
Observation goals PRIOR TO DISCHARGE     Comments:   -diagnostic tests and consults completed and resulted: not met   -vital signs normal or at patient baseline: partially met   -tolerating oral intake to maintain hydration: met   -adequate pain control on oral analgesics: partially met   -infection is improving: not met   -returns to baseline functional status: not met   -safe disposition plan has been identified: not met      Nurse to notify provider when observation goals have been met and patient is ready for discharge.

## 2021-02-11 NOTE — PHARMACY-ADMISSION MEDICATION HISTORY
Pharmacy Medication History  Admission medication history interview status for the 2/10/2021  admission is complete. See EPIC admission navigator for prior to admission medications     Location of Interview: Phone  Medication history sources: Patient, Surescripts and Care Everywhere    Significant changes made to the medication list:  Removed: first-mouthwash, metoclopramide, sucralfate    Additional medication history information:   Recent antimicrobials:  On 2/3/21, patient was prescribed Keflex 500mg for pharyngitis with instructions of 1 tablet by mouth three times daily. Patient stated he had one dose of the medication last Wednesday (2/3/21).    Medication reconciliation completed by provider prior to medication history? No    Time spent in this activity: 5 mins    Prior to Admission medications    Medication Sig Last Dose Taking? Auth Provider   amphetamine-dextroamphetamine (ADDERALL XR) 25 MG 24 hr capsule Take 25 mg by mouth Past Month at Unknown time Yes Reported, Patient       The information provided in this note is only as accurate as the sources available at the time of update(s)

## 2021-02-11 NOTE — PLAN OF CARE
Covid negative. Pt arrived from ED around 2345 with nausea/vomiting. A&Ox4. Low grade temp, all other VSS on RA. PRN Tylenol and ice pack given for headache. Does report some intermittent nausea with movement. IVF @100. Up SBA to bathroom. Did have small watery stool. Regular diet. Occult blood test was negative. Denies SOB, dizziness, and lightheadedness. Plan for GI consult.

## 2021-02-11 NOTE — PROGRESS NOTES
MD Notification    Notified Person: MD    Notified Person Name: Dr. Hamlin    Notification Date/Time: 2/11/21 @0441    Notification Interaction: Amcom    Purpose of Notification: Pt has a headache and is requesting Tylenol. Can he have this reordered?    Orders Received: Tylenol ordered    Comments:

## 2021-02-11 NOTE — ED NOTES
DATE:  2/10/2021   TIME OF RECEIPT FROM LAB:  2110  LAB TEST:  ALT  LAB VALUE:  1234  RESULTS GIVEN WITH READ-BACK TO (PROVIDER):  Cammie Luther MD  TIME LAB VALUE REPORTED TO PROVIDER:   2112

## 2021-02-11 NOTE — PLAN OF CARE
RN:  Patient A/O x4. Patient with slight improvement throughout the day.  Low grade temp of 99.5 orally.  Tylenol x1 for complaints of chills, low grade temp and back of the head/neck pain.  Robitussin x1 for complaints of cough with relief.  Tolerating diet with low appetite, mild c/o nausea; no emesis.  Up IND  to the bathroom.  Continues of IV fluids.  GI following.  More lab work ordered by the GI.  Discharge still pending lab workup and improvement in patient's overall status.

## 2021-02-11 NOTE — ED NOTES
"[facility]  ED Nurse Handoff Report    ED Chief complaint: Nausea & Vomiting      ED Diagnosis:   Final diagnoses:   EBV hepatitis       Code Status: Full Code    Allergies: No Known Allergies    Patient Story: recent diagnosis of EBV on 2/8/21 (two days ago) after having multiple negative work ups including COVID-19 testing, influenza, imaging, and chest imaging as well. The patient had been having constant fevers for the past 12 days along with fatigue, generalized weakness, cough, nausea, and vomiting. He was given an antiemetic by his PCP yesterday, but has not had time to pick it up. Along with his continued symptoms above, the patient comes in today for evaluation of nausea and vomiting. The patient notes he has had continual nausea and vomiting over the past couple of days and recently began to notice hematemesis and was \"spitting up\" blood as well. He notes he has not been able to tolerate solids or liquids as he is constantly dry heaving or will vomit instantly after drinking. He contacted his nurse line yesterday and told them about the hematemesis, but was instructed to keep trying to push fluids as he had mucous building up and little in his stomach, so that's why there was probably blood. However, after his symptoms continued and worsened he came in today for evaluation. He says he has had a constant cough throughout, but has not had any production to his knowledge. He states he has also been having some chills as well. He denies chest pain, shortness of breath, diaphoresis, diarrhea, bloody stools, or any other acute symptoms.     Focused Assessment:  A&Ox4, independent, ambulatory. Dry cough. Nauseated on arrival, afebrile upon presentation. Developed fever during ED course. No emesis. No abdominal pain. Breath sounds clear.     Treatments and/or interventions provided: labs, 1 liter NS, Zofran 4mg IV. Tolerating ice chips  Patient's response to treatments and/or interventions: " improved    To be done/followed up on inpatient unit:  admission orders    Does this patient have any cognitive concerns?: none    Activity level - Baseline/Home:  Independent  Activity Level - Current:   Independent    Patient's Preferred language: English   Needed?: No    Isolation: None  Infection: Not Applicable  Patient tested for COVID 19 prior to admission: YES  Bariatric?: No    Vital Signs:   Vitals:    02/10/21 2100 02/10/21 2130 02/10/21 2200 02/10/21 2212   BP: 114/77 118/77 116/77    Pulse: 88 88 89    Resp: 15 15 12    Temp:    101  F (38.3  C)   TempSrc:    Oral   SpO2: 96% 97% 97% 98%   Height:         Labs Ordered and Resulted from Time of ED Arrival Up to the Time of Departure from the ED   CBC WITH PLATELETS DIFFERENTIAL - Abnormal; Notable for the following components:       Result Value    RBC Count 4.39 (*)     Hemoglobin 12.9 (*)     Hematocrit 39.7 (*)     All other components within normal limits   COMPREHENSIVE METABOLIC PANEL - Abnormal; Notable for the following components:    Glucose 107 (*)     Alkaline Phosphatase 235 (*)     ALT 1,234 (*)      (*)     All other components within normal limits     Cardiac Rhythm:     Was the PSS-3 completed:   Yes  What interventions are required if any?               Family Comments: none  OBS brochure/video discussed/provided to patient/family: Yes              Name of person given brochure if not patient: n/a              Relationship to patient: n/a    For the majority of the shift this patient's behavior was Green.   Behavioral interventions performed were none.    ED NURSE PHONE NUMBER: *[phone]

## 2021-02-11 NOTE — PROVIDER NOTIFICATION
MD Notification    Notified Person: MD    Notified Person Name: Dr. Rasmussen     Notification Date/Time: 2/11/2021 5:50pm     Notification Interaction: web page     Purpose of Notification: FYI pt's temp 102.6. PRN Tylenol being given. Let us know if any other intervention needed. Thanks MR RN      Orders Received:    Comments:

## 2021-02-12 LAB
ALBUMIN SERPL-MCNC: 2.6 G/DL (ref 3.4–5)
ALP SERPL-CCNC: 217 U/L (ref 40–150)
ALT SERPL W P-5'-P-CCNC: 881 U/L (ref 0–70)
ANA SER QL IF: NEGATIVE
ANION GAP SERPL CALCULATED.3IONS-SCNC: 5 MMOL/L (ref 3–14)
AST SERPL W P-5'-P-CCNC: 393 U/L (ref 0–45)
BILIRUB DIRECT SERPL-MCNC: 0.3 MG/DL (ref 0–0.2)
BILIRUB SERPL-MCNC: 0.8 MG/DL (ref 0.2–1.3)
BUN SERPL-MCNC: 12 MG/DL (ref 7–30)
CALCIUM SERPL-MCNC: 7.9 MG/DL (ref 8.5–10.1)
CHLORIDE SERPL-SCNC: 102 MMOL/L (ref 94–109)
CO2 SERPL-SCNC: 26 MMOL/L (ref 20–32)
CREAT SERPL-MCNC: 1.17 MG/DL (ref 0.66–1.25)
ERYTHROCYTE [DISTWIDTH] IN BLOOD BY AUTOMATED COUNT: 13.2 % (ref 10–15)
GFR SERPL CREATININE-BSD FRML MDRD: 83 ML/MIN/{1.73_M2}
GLUCOSE SERPL-MCNC: 90 MG/DL (ref 70–99)
HBV DNA SERPL NAA+PROBE-ACNC: NORMAL [IU]/ML
HBV DNA SERPL NAA+PROBE-LOG IU: NORMAL {LOG_IU}/ML
HCT VFR BLD AUTO: 35.3 % (ref 40–53)
HGB BLD-MCNC: 11.4 G/DL (ref 13.3–17.7)
IGG SERPL-MCNC: 1222 MG/DL (ref 610–1616)
INR PPP: 1.38 (ref 0.86–1.14)
MCH RBC QN AUTO: 28.7 PG (ref 26.5–33)
MCHC RBC AUTO-ENTMCNC: 32.3 G/DL (ref 31.5–36.5)
MCV RBC AUTO: 89 FL (ref 78–100)
PLATELET # BLD AUTO: 176 10E9/L (ref 150–450)
POTASSIUM SERPL-SCNC: 4 MMOL/L (ref 3.4–5.3)
PROT SERPL-MCNC: 6.3 G/DL (ref 6.8–8.8)
RBC # BLD AUTO: 3.97 10E12/L (ref 4.4–5.9)
SMA IGG SER-ACNC: 25 UNITS (ref 0–19)
SODIUM SERPL-SCNC: 133 MMOL/L (ref 133–144)
WBC # BLD AUTO: 8 10E9/L (ref 4–11)

## 2021-02-12 PROCEDURE — 96376 TX/PRO/DX INJ SAME DRUG ADON: CPT

## 2021-02-12 PROCEDURE — 80053 COMPREHEN METABOLIC PANEL: CPT | Performed by: INTERNAL MEDICINE

## 2021-02-12 PROCEDURE — 258N000003 HC RX IP 258 OP 636: Performed by: STUDENT IN AN ORGANIZED HEALTH CARE EDUCATION/TRAINING PROGRAM

## 2021-02-12 PROCEDURE — 99225 PR SUBSEQUENT OBSERVATION CARE,LEVEL II: CPT | Performed by: INTERNAL MEDICINE

## 2021-02-12 PROCEDURE — 85610 PROTHROMBIN TIME: CPT | Performed by: INTERNAL MEDICINE

## 2021-02-12 PROCEDURE — 82248 BILIRUBIN DIRECT: CPT | Performed by: INTERNAL MEDICINE

## 2021-02-12 PROCEDURE — 250N000013 HC RX MED GY IP 250 OP 250 PS 637: Performed by: STUDENT IN AN ORGANIZED HEALTH CARE EDUCATION/TRAINING PROGRAM

## 2021-02-12 PROCEDURE — G0378 HOSPITAL OBSERVATION PER HR: HCPCS

## 2021-02-12 PROCEDURE — 250N000013 HC RX MED GY IP 250 OP 250 PS 637: Performed by: INTERNAL MEDICINE

## 2021-02-12 PROCEDURE — 99207 PR CDG-CODE CATEGORY CHANGED: CPT | Performed by: INTERNAL MEDICINE

## 2021-02-12 PROCEDURE — 258N000001 HC RX 258: Performed by: INTERNAL MEDICINE

## 2021-02-12 PROCEDURE — 36415 COLL VENOUS BLD VENIPUNCTURE: CPT | Performed by: INTERNAL MEDICINE

## 2021-02-12 PROCEDURE — 85027 COMPLETE CBC AUTOMATED: CPT | Performed by: INTERNAL MEDICINE

## 2021-02-12 PROCEDURE — C9113 INJ PANTOPRAZOLE SODIUM, VIA: HCPCS | Performed by: STUDENT IN AN ORGANIZED HEALTH CARE EDUCATION/TRAINING PROGRAM

## 2021-02-12 PROCEDURE — 250N000011 HC RX IP 250 OP 636: Performed by: STUDENT IN AN ORGANIZED HEALTH CARE EDUCATION/TRAINING PROGRAM

## 2021-02-12 RX ORDER — DEXTROSE MONOHYDRATE, SODIUM CHLORIDE, AND POTASSIUM CHLORIDE 50; 1.49; 9 G/1000ML; G/1000ML; G/1000ML
INJECTION, SOLUTION INTRAVENOUS CONTINUOUS
Status: DISCONTINUED | OUTPATIENT
Start: 2021-02-12 | End: 2021-02-13 | Stop reason: HOSPADM

## 2021-02-12 RX ADMIN — GUAIFENESIN 10 ML: 200 SOLUTION ORAL at 08:54

## 2021-02-12 RX ADMIN — ACETAMINOPHEN 650 MG: 325 TABLET, FILM COATED ORAL at 08:54

## 2021-02-12 RX ADMIN — Medication 1 LOZENGE: at 19:39

## 2021-02-12 RX ADMIN — OXYCODONE HYDROCHLORIDE 5 MG: 5 TABLET ORAL at 19:39

## 2021-02-12 RX ADMIN — Medication 1 LOZENGE: at 15:05

## 2021-02-12 RX ADMIN — ACETAMINOPHEN 650 MG: 325 TABLET, FILM COATED ORAL at 15:05

## 2021-02-12 RX ADMIN — GUAIFENESIN 10 ML: 200 SOLUTION ORAL at 15:05

## 2021-02-12 RX ADMIN — PANTOPRAZOLE SODIUM 40 MG: 40 INJECTION, POWDER, FOR SOLUTION INTRAVENOUS at 08:54

## 2021-02-12 RX ADMIN — ACETAMINOPHEN 650 MG: 325 TABLET, FILM COATED ORAL at 22:33

## 2021-02-12 RX ADMIN — SODIUM CHLORIDE, POTASSIUM CHLORIDE, SODIUM LACTATE AND CALCIUM CHLORIDE: 600; 310; 30; 20 INJECTION, SOLUTION INTRAVENOUS at 12:49

## 2021-02-12 RX ADMIN — Medication 1 LOZENGE: at 16:10

## 2021-02-12 RX ADMIN — Medication 1 LOZENGE: at 18:40

## 2021-02-12 RX ADMIN — ACETAMINOPHEN 650 MG: 325 TABLET, FILM COATED ORAL at 02:23

## 2021-02-12 RX ADMIN — POTASSIUM CHLORIDE, DEXTROSE MONOHYDRATE AND SODIUM CHLORIDE 100 ML/HR: 150; 5; 900 INJECTION, SOLUTION INTRAVENOUS at 16:04

## 2021-02-12 RX ADMIN — SODIUM CHLORIDE, POTASSIUM CHLORIDE, SODIUM LACTATE AND CALCIUM CHLORIDE: 600; 310; 30; 20 INJECTION, SOLUTION INTRAVENOUS at 03:39

## 2021-02-12 RX ADMIN — GUAIFENESIN 10 ML: 200 SOLUTION ORAL at 19:00

## 2021-02-12 NOTE — PLAN OF CARE
Observation goals PRIOR TO DISCHARGE     Comments: -diagnostic tests and consults completed and resulted , not met  -vital signs normal or at patient baseline , not met  -tolerating oral intake to maintain hydration , not met  -adequate pain control on oral analgesics met  -infection is improving , not met  -returns to baseline functional status ,met  -safe disposition plan has been identified , met  Nurse to notify provider when observation goals have been met and patient is ready for discharge

## 2021-02-12 NOTE — PLAN OF CARE
Observation goals PRIOR TO DISCHARGE     Comments: -diagnostic tests and consults completed and resulted , not met  -vital signs normal or at patient baseline ,not met spiking temps  -tolerating oral intake to maintain hydration ,met  -adequate pain control on oral analgesics , met  -infection is improving not met  -returns to baseline functional status  met  -safe disposition plan has been identified met  Nurse to notify provider when observation goals have been met and patient is ready for discharge.

## 2021-02-12 NOTE — PROGRESS NOTES
Observation goals PRIOR TO DISCHARGE     Comments:   -diagnostic tests and consults completed and resulted: not met   -vital signs normal or at patient baseline: met   -tolerating oral intake to maintain hydration: partially met   -adequate pain control on oral analgesics: met   -infection is improving: not met   -returns to baseline functional status: partially met   -safe disposition plan has been identified: met     Nurse to notify provider when observation goals have been met and patient is ready for discharge.

## 2021-02-12 NOTE — PROVIDER NOTIFICATION
MD Notification    Notified Person: PA     Notified Person Name: Joanna Barthell     Notification Date/Time: 2/11/21 6:27pm     Notification Interaction: web page     Purpose of Notification: OBS-22 LI - FYI temp 102.6. PRN Tylenol given. Let us know if any other intervention needed.     Orders Received:    Comments:

## 2021-02-12 NOTE — PROGRESS NOTES
-diagnostic tests and consults completed and resulted - met  -vital signs normal or at patient baseline - in progress  -tolerating oral intake to maintain hydration - in progress  -adequate pain control on oral analgesics - met  -infection is improving - in progress  -returns to baseline functional status - met   -safe disposition plan has been identified - met     Patient A&0x4. Up ad savana. Lungs diminished in bases. Frequent dry cough noted. No edema. On RA. Patient c/o HA due to frequent cough. Tylenol and robitussin for comfort. VSS. Max temp 101.1 this shift

## 2021-02-12 NOTE — PROGRESS NOTES
"[facility] Progress Note     Interval History:    C/o cough. No nausea, vomiting, abdominal pain, or altered bowels. Transaminases trending down. INR/alk phos stable.     Physical Exam:    /64 (BP Location: Right arm)   Pulse 91   Temp 99.8  F (37.7  C) (Oral)   Resp 18   Ht 1.88 m (6' 2\")   Wt 91.6 kg (202 lb)   SpO2 96%   BMI 25.94 kg/m    Temp (24hrs), Av.2  F (37.9  C), Min:97.9  F (36.6  C), Max:102.6  F (39.2  C)    Patient Vitals for the past 72 hrs:   Weight   02/10/21 2342 91.6 kg (202 lb)       Intake/Output Summary (Last 24 hours) at 2021 1107  Last data filed at 2021 2000  Gross per 24 hour   Intake 400 ml   Output --   Net 400 ml       Constitutional: No acute distress  Cardiovascular: RRR, normal S1/S2  Respiratory: Effort normal, CTA bilaterally  Abdomen: Soft, nondistended, nontender    Laboratory Data  Recent Labs   Lab Test 21  0610 21  1426 21  0619 02/10/21  2019   WBC 8.0  --  6.0 6.8   HGB 11.4*  --  11.4* 12.9*   MCV 89  --  88 90     --  162 178   INR 1.38* 1.38*  --   --      Recent Labs   Lab Test 21  0610 02/11/21  0619 02/10/21  2019    136 135   POTASSIUM 4.0 3.8 3.7   CHLORIDE 102 106 102   CO2 26 24 26   BUN 12 16 16   CR 1.17 1.20 1.10   ANIONGAP 5 6 7   AME 7.9* 7.7* 8.6     Recent Labs   Lab Test 21  0610 21  0619 02/10/21  2019 02/07/21  1356 21  1107 16  1857   ALBUMIN 2.6* 2.7* 3.5  --  3.5 3.8   BILITOTAL 0.8 0.8 1.0  --  0.6 0.6   DBIL 0.3* 0.5*  --   --  0.2  --    * 1,043* 1,234*  --  287* 23   * 582* 786*  --  145* 16   ALKPHOS 217* 214* 235*  --  150 62   PROTEIN  --   --   --  30*  --   --    LIPASE  --   --   --   --  112 96       Imaging  US ABDOMEN LIMITED   2021 12:41 PM      HISTORY: Transaminitis, fever unknown origin.          FINDINGS:    Gallbladder: Normal with no cholelithiasis, wall thickening or focal  tenderness.    Bile ducts:  CHD is normal diameter.  No " intrahepatic biliary  dilatation. The distal portion of the common bile duct is obscured by  overlying bowel gas.  Liver: Demonstrates normal parenchymal echogenicity. No focal hepatic  mass visualized.  Pancreas:  Partially obscured by overlying bowel gas,  but grossly  unremarkable.   Right kidney:  No hydronephrosis or shadowing calculi.  Aorta and IVC:  Not specifically assessed.                                                                IMPRESSION:  Normal right upper quadrant abdominal ultrasound.     EXAM: CT CHEST ABDOMEN PELVIS W  LOCATION: [facility]  DATE/TIME: 2/8/2021 10:41 AM    INDICATION: Fever, Unknown Origin  COMPARISON: None.  TECHNIQUE: CT scan of the chest, abdomen, and pelvis was performed following injection of IV contrast. Multiplanar reformats were obtained. Dose reduction techniques were used.   CONTRAST: Omnipaque 350 109 ml    FINDINGS:   LUNGS AND PLEURA: Mild bibasilar atelectasis. Lungs are otherwise clear.  MEDIASTINUM/AXILLAE: Heart is normal in size. No adenopathy.  HEPATOBILIARY: Liver and gallbladder within normal limits.  PANCREAS: Normal.  SPLEEN: Normal.  ADRENAL GLANDS: Normal.  KIDNEYS/BLADDER: Normal.  BOWEL: Bowel within normal limits.  LYMPH NODES: Normal.  VASCULATURE: Unremarkable.  PELVIC ORGANS: Bladder within normal limits. Trace free fluid noted within the lower abdomen and pelvis which is nonspecific  MUSCULOSKELETAL: Normal.    IMPRESSION:  1.  No CT explanation for fever within the chest, abdomen, and pelvis.  2.  Very small free fluid noted within the lower abdomen and pelvis which is nonspecific. May be related to enteritis or peritonitis. No abscess. No drainable fluid.     Assessment & Plan:  1. Elevated liver enzymes - 2/7-2/10-2/11-2/12 [phone]; [phone]), total bilirubin normal, INR 1.3. Normal right upper quadrant ultrasound and liver on CT.  Suspect the elevation in liver enzymes is secondary to his EBV infection.   Although mild increased liver enzymes are often seen with EBV, 5-10 times upper limits of normal increases can be seen in frequently with EBV.  Occasionally autoimmune hepatitis can be associated with EBV infection, thus we will check autoimmune markers.  He strictly does not report acetaminophen use greater than the recommended dosage and denies any other toxin use.  It is a good sign that the liver enzymes are decreasing, because rarely patients can progress to overt liver failure which has a high mortality rate.  With the decreasing liver enzymes and normal bilirubin, this seems less likely at this point.    2.  Blood-streaked sputum-this occurred after multiple episodes of dry heaves or gagging.  There was no significant hematemesis.  On further questioning he reports that his stool was dark brown and not black yesterday.  His Hemoccult is negative.  Hemoglobin is only mildly decreased, which could be IV fluids.  Will monitor for any evidence of GI bleeding at this point, no further endoscopic intervention needed unless there is overt GI bleeding.     3. Hepatitis BE antigen positive - the remainder of the hep B serology - HBsAg, HBsAb, HBcAb are negative, question if this is a false positive, reviewed with [facility] Liver service today, will check HBV level to make sure it is negative.     Plan:  -Monitor LFTs/INR   -Autoimmune markers (HARIKA, smooth muscle antibody) and extended liver serology workup (Ceruloplasmin, alpha 1 antitrypsin), pending   -HBV level pending   -Monitor for overt GI bleeding    Will discuss with Dr. Ty.    Sun Rangel CNP   [facility]  Office: [phone]

## 2021-02-12 NOTE — PLAN OF CARE
A&Ox4. VSS on RA. Up independently. LR infusing @100. Intermittent dry cough. PRN Tylenol and ice pack given for headache. Denies nausea, dizziness, SOB or lightheadedness. GI following.

## 2021-02-12 NOTE — UTILIZATION REVIEW
Concurrent stay review; Secondary Review Determination     [facility]          Under the authority of the Utilization Management Committee, the utilization review process indicated a secondary review on the above patient.  The review outcome is based on review of the medical records, discussions with staff, and applying clinical experience noted on the date of the review.          (x) Observation Status Appropriate - Concurrent stay review    RATIONALE FOR DETERMINATION   30 year old previously healthy male admitted on 2/10/2021 with infectious mononucleosis and transaminase elevation.  LFT's continue to trend down. Hgb 12.9->11.4. Tmax on 2/11 was 101.5, on 2/12 99.2.  Care discussed with Dr. Rasmussen, patient mostly receiving supportive care, anticipated discharge tomorrow if tolerating oral intake and fever improves.     The severity of illness, intensity of service provided, expected LOS and risk for adverse outcome make the care appropriate for observation.      This document was produced using voice recognition software       The information on this document is developed by the utilization review team in order for the business office to ensure compliance.  This only denotes the appropriateness of proper admission status and does not reflect the quality of care rendered.         The definitions of Inpatient Status and Observation Status used in making the determination above are those provided in the CMS Coverage Manual, Chapter 1 and Chapter 6, section 70.4.      Sincerely,     MARY OLSEN MD    System Medical Director  Utilization Management  [facility].

## 2021-02-12 NOTE — PROGRESS NOTES
-diagnostic tests and consults completed and resulted - met  -vital signs normal or at patient baseline - in progress  -tolerating oral intake to maintain hydration - in progress  -adequate pain control on oral analgesics - met   -infection is improving - in progress  -returns to baseline functional status - met   -safe disposition plan has been identified - met    Since yesterday I feel a sharp pain in my chest

## 2021-02-12 NOTE — PROGRESS NOTES
[facility]    Hospitalist Progress Note    Assessment & Plan   Angelo James is a 30 year old previously healthy male admitted on 2/10/2021 with infectious mononucleosis and transaminase elevation.      Infectious Mononucleosis   Pt has had now 4 visits for persistent fever, chills, fatigue, generalized weakness, cough, nausea, and vomiting. He had extensive work-up for this and ultimately had positive EBV capsid antibody IgG and IgM, suggesting recent/current EBV infection.   bld cx ngtd 2/7, covid negative X 2  RSV, influenza A and B negative  -nl bmp this am. Wbc 6, Hb 12-->11 range  Ferritin 3304, nl iron sat  guaic negative  ALT 1234--> 1043  --> 582  Nl Bili, minimal elevation alk phos  LfTs improving,nonobstructive pattern.   -CT CAP on 2/8 that did not show any abnormality of the liver. He denies any recent alcohol use.  -nl Tylenol level  -INR 1.38  -nl RUQ US  -EBV/Accounts for transaminase elevation  -Hepatitis BE AG+: other hep B serology, HBsAG, HBsAb, HBcAb are negative, likely false positive-->  HBV level quant negative.    HARIKA negative, IGG 1,222   - fatigue, dry cough, fever. Appetite may be better  Having fevers. Nl biliary tree. 2/2 to EBV infection. No other signs infection or complications. Treat with tylenol.     Today; felt pretty good this am now with high fever again uncomfortable  LFTS improved. No new foccal GI or ENT/URI signs/sxs.   Had negative  Suspect pt's fever and constellation of sxs including URI sxs of cough all 2/2 to EBV infection. Discussed with GI today    Plan;   -hold on further eval of fever  - [facility] to see on Saturday  - cont fluids for now, am labs  - Supportive therapies with fluids, pain control and anti-emetics. LR fluids, prn tylenol, antiemetics, prn oxycodone  -Cerruloplasmin, alpha1 antitrypsin,F Actin EIA with reflex pending   -GI following, appreciate input   -likely discharge tomorrow  -am labs  -will need pcp follow up 3-4  days with LfTS. Outpatient LFTs should be followed until normalized, GI referral outpatient if not normalize     Blood streaked sputum  Possible melena   Mild anemia   Pt reports frequent gagging, with blood streaked sputum production, and reported having a black stool yesterday. This in the setting of regular NSAID use. He does have slightly low hgb of 12.9.   -guaic negative.   - He did receive ketorolac in the ED- will avoid additional NSAID use until we can assure that he is not having a GI bleed   -monitor for overt bleeding. No plan for endoscopy at this time  - IV Protonix      Diet: Regular Diet Adult  DVT Prophylaxis: Low Risk/Ambulatory with no VTE prophylaxis indicated  Haddad Catheter: not present  Code Status: Full Code                 Disposition Plan     Expected discharge: Tomorrow possible, recommended to prior living arrangement once adequate pain management/ tolerating PO medications and GI consult .    Discussed care plan with rn, pt and GI today    Bertrand Rasmussen MD  Text Page  (7am to 6pm)  Interval History   Seen by GI  Fever this afternoon  Felt pretty good earlier and up walking and taking po now worse with onset of fevers.   Dry cough for 1- 2 weeks- persists  Nausea, fatigue.   No abd pain, neck.   Mild HA,   No neuro sx. Mild sore throat with cough. No neck pain  No abd pain      -Data reviewed today: I reviewed all new labs and imaging results over the last 24 hours. I personally reviewed     Physical Exam   Temp: 102  F (38.9  C) Temp src: Oral BP: 114/75 Pulse: 95   Resp: 20 SpO2: 96 % O2 Device: None (Room air)    Vitals:    02/10/21 2342   Weight: 91.6 kg (202 lb)     Vital Signs with Ranges  Temp:  [97.9  F (36.6  C)-102.6  F (39.2  C)] 102  F (38.9  C)  Pulse:  [] 95  Resp:  [16-24] 20  BP: (100-117)/(60-75) 114/75  SpO2:  [94 %-97 %] 96 %  I/O last 3 completed shifts:  In: 400 [P.O.:400]  Out: -     Constitutional: nad, nl, looks a little better  Heent: nl sclera, nl  op  Neck:supple, NT, no clear LD again today  Respiratory: CTAB, dry cough  Cardiovascular: RRR no r/g/m  GI: soft, nt, nd. No ruq or LUQ tenderness  Skin/Integumen: no rash, edema  Neuro/psych: nl speech and mentation, nl affect      Medications     lactated ringers 100 mL/hr at 02/12/21 1249       pantoprazole (PROTONIX) IV  40 mg Intravenous Daily       Data   Recent Labs   Lab 02/12/21  0610 02/11/21  1426 02/11/21  0619 02/10/21  2019 02/07/21  1107   WBC 8.0  --  6.0 6.8 5.5   HGB 11.4*  --  11.4* 12.9* 13.1*   MCV 89  --  88 90 88     --  162 178 163   INR 1.38* 1.38*  --   --   --      --  136 135 135   POTASSIUM 4.0  --  3.8 3.7 3.8   CHLORIDE 102  --  106 102 104   CO2 26  --  24 26 25   BUN 12  --  16 16 13   CR 1.17  --  1.20 1.10 1.32*   ANIONGAP 5  --  6 7 6   AME 7.9*  --  7.7* 8.6 8.3*   GLC 90  --  91 107* 113*   ALBUMIN 2.6*  --  2.7* 3.5 3.5   PROTTOTAL 6.3*  --  6.2* 7.6 6.9   BILITOTAL 0.8  --  0.8 1.0 0.6   ALKPHOS 217*  --  214* 235* 150   *  --  1,043* 1,234* 287*   *  --  582* 786* 145*   LIPASE  --   --   --   --  112       Imaging:   No results found for this or any previous visit (from the past 24 hour(s)).

## 2021-02-12 NOTE — PLAN OF CARE
Observation goals PRIOR TO DISCHARGE     Comments: -diagnostic tests and consults completed and resulted /not met  -vital signs normal or at patient baseline , not met spiking temps  -tolerating oral intake to maintain hydration  low intake  -adequate pain control on oral analgesics , met  -infection is improving , not met  -returns to baseline functional status .met  -safe disposition plan has been identified , met  Pt alert and oriented, vs with elevated temp. Generalized weakness. Tolerating small amounts of diet. Voiding without diff.  Monitoring labs      Nurse to notify provider when observation goals have been met and patient is ready for discharge

## 2021-02-13 VITALS
WEIGHT: 202 LBS | OXYGEN SATURATION: 97 % | SYSTOLIC BLOOD PRESSURE: 106 MMHG | HEIGHT: 74 IN | BODY MASS INDEX: 25.93 KG/M2 | HEART RATE: 92 BPM | RESPIRATION RATE: 18 BRPM | TEMPERATURE: 96.4 F | DIASTOLIC BLOOD PRESSURE: 69 MMHG

## 2021-02-13 LAB
ALBUMIN SERPL-MCNC: 2.5 G/DL (ref 3.4–5)
ALP SERPL-CCNC: 226 U/L (ref 40–150)
ALT SERPL W P-5'-P-CCNC: 629 U/L (ref 0–70)
ANION GAP SERPL CALCULATED.3IONS-SCNC: 1 MMOL/L (ref 3–14)
AST SERPL W P-5'-P-CCNC: 191 U/L (ref 0–45)
BACTERIA SPEC CULT: NO GROWTH
BACTERIA SPEC CULT: NO GROWTH
BASOPHILS # BLD AUTO: 0.1 10E9/L (ref 0–0.2)
BASOPHILS NFR BLD AUTO: 1 %
BILIRUB DIRECT SERPL-MCNC: 0.3 MG/DL (ref 0–0.2)
BILIRUB SERPL-MCNC: 0.7 MG/DL (ref 0.2–1.3)
BUN SERPL-MCNC: 9 MG/DL (ref 7–30)
CALCIUM SERPL-MCNC: 7.9 MG/DL (ref 8.5–10.1)
CHLORIDE SERPL-SCNC: 101 MMOL/L (ref 94–109)
CO2 SERPL-SCNC: 29 MMOL/L (ref 20–32)
CREAT SERPL-MCNC: 1.35 MG/DL (ref 0.66–1.25)
DIFFERENTIAL METHOD BLD: ABNORMAL
EOSINOPHIL # BLD AUTO: 0 10E9/L (ref 0–0.7)
EOSINOPHIL NFR BLD AUTO: 0 %
ERYTHROCYTE [DISTWIDTH] IN BLOOD BY AUTOMATED COUNT: 13.4 % (ref 10–15)
GFR SERPL CREATININE-BSD FRML MDRD: 70 ML/MIN/{1.73_M2}
GLUCOSE SERPL-MCNC: 102 MG/DL (ref 70–99)
HCT VFR BLD AUTO: 36.9 % (ref 40–53)
HGB BLD-MCNC: 12 G/DL (ref 13.3–17.7)
INR PPP: 1.28 (ref 0.86–1.14)
LYMPHOCYTES # BLD AUTO: 5.4 10E9/L (ref 0.8–5.3)
LYMPHOCYTES NFR BLD AUTO: 60 %
MCH RBC QN AUTO: 29.2 PG (ref 26.5–33)
MCHC RBC AUTO-ENTMCNC: 32.5 G/DL (ref 31.5–36.5)
MCV RBC AUTO: 90 FL (ref 78–100)
MONOCYTES # BLD AUTO: 1 10E9/L (ref 0–1.3)
MONOCYTES NFR BLD AUTO: 11 %
NEUTROPHILS # BLD AUTO: 2.5 10E9/L (ref 1.6–8.3)
NEUTROPHILS NFR BLD AUTO: 28 %
PLATELET # BLD AUTO: 184 10E9/L (ref 150–450)
PLATELET # BLD EST: ABNORMAL 10*3/UL
POTASSIUM SERPL-SCNC: 4.5 MMOL/L (ref 3.4–5.3)
PROT SERPL-MCNC: 6.5 G/DL (ref 6.8–8.8)
RBC # BLD AUTO: 4.11 10E12/L (ref 4.4–5.9)
RBC MORPH BLD: ABNORMAL
SMOOTH MUSCLE ABY IGG TITER: ABNORMAL
SMUDGE CELLS BLD QL SMEAR: PRESENT
SODIUM SERPL-SCNC: 131 MMOL/L (ref 133–144)
SPECIMEN SOURCE: NORMAL
SPECIMEN SOURCE: NORMAL
WBC # BLD AUTO: 9 10E9/L (ref 4–11)

## 2021-02-13 PROCEDURE — G0378 HOSPITAL OBSERVATION PER HR: HCPCS

## 2021-02-13 PROCEDURE — 258N000001 HC RX 258: Performed by: INTERNAL MEDICINE

## 2021-02-13 PROCEDURE — 36415 COLL VENOUS BLD VENIPUNCTURE: CPT | Performed by: INTERNAL MEDICINE

## 2021-02-13 PROCEDURE — 250N000011 HC RX IP 250 OP 636: Performed by: STUDENT IN AN ORGANIZED HEALTH CARE EDUCATION/TRAINING PROGRAM

## 2021-02-13 PROCEDURE — C9113 INJ PANTOPRAZOLE SODIUM, VIA: HCPCS | Performed by: STUDENT IN AN ORGANIZED HEALTH CARE EDUCATION/TRAINING PROGRAM

## 2021-02-13 PROCEDURE — 250N000013 HC RX MED GY IP 250 OP 250 PS 637: Performed by: INTERNAL MEDICINE

## 2021-02-13 PROCEDURE — 250N000013 HC RX MED GY IP 250 OP 250 PS 637: Performed by: STUDENT IN AN ORGANIZED HEALTH CARE EDUCATION/TRAINING PROGRAM

## 2021-02-13 PROCEDURE — 85610 PROTHROMBIN TIME: CPT | Performed by: INTERNAL MEDICINE

## 2021-02-13 PROCEDURE — 85025 COMPLETE CBC W/AUTO DIFF WBC: CPT | Performed by: INTERNAL MEDICINE

## 2021-02-13 PROCEDURE — 96376 TX/PRO/DX INJ SAME DRUG ADON: CPT

## 2021-02-13 PROCEDURE — 99217 PR OBSERVATION CARE DISCHARGE: CPT | Performed by: PHYSICIAN ASSISTANT

## 2021-02-13 PROCEDURE — 80048 BASIC METABOLIC PNL TOTAL CA: CPT | Performed by: INTERNAL MEDICINE

## 2021-02-13 PROCEDURE — 80076 HEPATIC FUNCTION PANEL: CPT | Performed by: INTERNAL MEDICINE

## 2021-02-13 RX ORDER — OXYCODONE HYDROCHLORIDE 5 MG/1
5 TABLET ORAL EVERY 4 HOURS PRN
Qty: 12 TABLET | Refills: 0 | Status: SHIPPED | OUTPATIENT
Start: 2021-02-13

## 2021-02-13 RX ORDER — ACETAMINOPHEN 325 MG/1
650 TABLET ORAL EVERY 6 HOURS PRN
COMMUNITY
Start: 2021-02-13

## 2021-02-13 RX ADMIN — GUAIFENESIN 10 ML: 200 SOLUTION ORAL at 12:08

## 2021-02-13 RX ADMIN — OXYCODONE HYDROCHLORIDE 5 MG: 5 TABLET ORAL at 01:52

## 2021-02-13 RX ADMIN — POTASSIUM CHLORIDE, DEXTROSE MONOHYDRATE AND SODIUM CHLORIDE: 150; 5; 900 INJECTION, SOLUTION INTRAVENOUS at 02:20

## 2021-02-13 RX ADMIN — ACETAMINOPHEN 650 MG: 325 TABLET, FILM COATED ORAL at 07:57

## 2021-02-13 RX ADMIN — OXYCODONE HYDROCHLORIDE 5 MG: 5 TABLET ORAL at 07:57

## 2021-02-13 RX ADMIN — ACETAMINOPHEN 650 MG: 325 TABLET, FILM COATED ORAL at 14:06

## 2021-02-13 RX ADMIN — GUAIFENESIN 10 ML: 200 SOLUTION ORAL at 07:57

## 2021-02-13 RX ADMIN — PANTOPRAZOLE SODIUM 40 MG: 40 INJECTION, POWDER, FOR SOLUTION INTRAVENOUS at 07:58

## 2021-02-13 NOTE — DISCHARGE SUMMARY
[facility]    Discharge Summary  Hospitalist    Date of Admission:  2/10/2021  Date of Discharge:  2/13/2021  Discharging Provider: James Medina  Date of Service (when I saw the patient): 02/13/21    Discharge Diagnoses   1. Infectious Mononucleosis   2. Acute hepatitis related to EBV infection  3. Blood streaked sputum  4. Possible melena   5. Mild anemia     History of Present Illness   Angelo James is a 30 year old male who presents with multiple symptoms including fever, chills, body aches, sore throat, abdominal pain, nausea and vomiting.      Pt initially evaluated in urgent care on 2/2/2021 with fever, body aches, and sore throat. He had a covid test prior to that which was negative. He was diagnosed with pharyngitis and prescribed a course of keflex.      Pt then presented to the ED on 2/7/2021 with on-going symptoms. During this visit he was febrile and tachycardic. CXR was obtained which was normal. He was found to have a mildly elevated creatinine to 1.32, He had mildly elevated transaminases with ALT of 287, and AST of 145. RUQ US was normal. Repeat COVID test was negative. Influenza was negative as well. Mono screen was negative at this time, EBV antibody testing was ordered, but not resulted at the time of discharge. He was discharged home from the ED.      He returned to urgent care on 2/8/2021 with persistent fevers. At this time he had a CT chest, abdomen, pelvis which showed a very small amount of free fluid in the lower abdomen and pelvis which was non-specific. There was not other abnormality noted.      Subsequently his EBV capsid antibody IgG and IgM testing came back positive suggesting current/recent EBV infection.      Today he called the nursing line and reported that he started vomiting blood. He was instructed to present to the ED again.      Here, he is hemodynamically stable but febrile to 101. BMP and CBC are unremarkable. He did have worsening  LFTs with ALT of 1,234, and AST of 786.      He is admitted to observation for symptomatic treatment of mononucleosis.    Hospital Course     Infectious Mononucleosis   Hepatitis related to EBV infection  Pt has had 4 visits for persistent fever, chills, fatigue, generalized weakness, cough, nausea, and vomiting. He had extensive work-up for this and ultimately had positive EBV capsid antibody IgG and IgM, suggesting recent/current EBV infection. Suspect pt's fever and constellation of sxs including URI sxs of cough all 2/2 to EBV infection.   --Blood cultures no growth to date 2/7, covid PCR negative X 2  --RSV, influenza A and B negative  --Wbc 6, Hb 12-->11 range  --Ferritin 3304, nl iron sat  --guaic negative  --ALT 1234--> 1043--881--629  ----> 582--393--191  --Nl Bili, minimal elevation alk phos, INR 1.38  --LFTs improving, nonobstructive pattern.   --CT CAP on 2/8 that did not show any abnormality of the liver. He denies any recent alcohol use.  --Tylenol level wnl  --Normal RUQ US  --EBV most likely culprit for transaminase elevation  --Hepatitis BE AG+: other hep B serology, HBsAG, HBsAb, HBcAb are negative, likely false positive-->  HBV level quant negative.    --HARIKA negative, IGG 1,222   --GI consulted, appreciate input   --Supportive therapies with fluids, pain control and anti-emetics. prn tylenol, prn oxycodone 5 mg (limited course, Disp #12 tabs)  --Ceruloplasmin, alpha1 antitrypsin,F Actin EIA with reflex pending  --PCP follow up 3-4 days with LFTS. Outpatient LFTs should be followed until normalized, GI referral outpatient if not normalized.     Blood streaked sputum  Possible melena   Mild anemia   Pt reports frequent gagging, with blood streaked sputum production, and reported having a black stool. This in the setting of regular NSAID use. He does have slightly low hgb of 12.9.   Recent Labs   Lab 02/13/21  0648 02/12/21  0610 02/11/21  0619 02/10/21  2019   HGB 12.0* 11.4* 11.4* 12.9*      --Hgb remains stable.  --Guaic negative.   --He did receive ketorolac in the ED- will avoid NSAID use for now  --No evidence for overt bleeding. No plan for endoscopy at this time  --Follow up with PCP      James Medina PA-C    Significant Results and Procedures   As documented.    Pending Results   These results will be followed up by PCP or [facility]   Unresulted Labs Ordered in the Past 30 Days of this Admission     Date and Time Order Name Status Description    2/11/2021 1401 Ceruloplasmin In process     2/11/2021 1401 Alpha 1 Antitrypsin In process           Code Status   Full Code       Primary Care Physician   MARKUS VEGA    Physical Exam   Temp: 96.4  F (35.8  C) Temp src: Oral BP: 106/69 Pulse: 92   Resp: 18 SpO2: 97 % O2 Device: None (Room air)    Vitals:    02/10/21 2342   Weight: 91.6 kg (202 lb)     Vital Signs with Ranges  Temp:  [96.4  F (35.8  C)-101.4  F (38.6  C)] 96.4  F (35.8  C)  Pulse:  [] 92  Resp:  [18-24] 18  BP: ()/(54-75) 106/69  SpO2:  [93 %-99 %] 97 %  I/O last 3 completed shifts:  In: 720 [P.O.:720]  Out: 1250 [Urine:1250]    Constitutional: Adult male, alert, cooperative, looks well and in nad  HEENT: nl sclera, nl op  Respiratory:   CTAB, dry cough  Cardiovascular: RRR no murmurs  GI: soft, NT/ND, nl BS  Skin/Integumen: no rash, edema  Neuro/psych: nl speech and mentation, nl affect    Discharge Disposition   Discharged to home  Condition at discharge: Stable    Consultations This Hospital Stay   GASTROENTEROLOGY IP CONSULT    Discharge Orders      Reason for your hospital stay    Boaz-Barr virus (mononucleosis), EBV hepatitis     Follow-up and recommended labs and tests     Follow up with primary care provider, MARKUS VEGA, within 3-5 days for hospital follow- up.  The following labs/tests are recommended: CBC, liver function tests.     Activity    Your activity upon discharge: activity as tolerated     Follow-up and recommended labs and tests      Hospital Follow Up Appointment Scheduled with LFTs:   Tuesday February 16, 2021 at 10:00 AM with Alan PELAYO  PCP: Abisai Cavazos MD  [phone]) [address] [place] [address]     Diet    Follow this diet upon discharge:  Regular Diet Adult     Discharge Medications   Current Discharge Medication List      START taking these medications    Details   acetaminophen (TYLENOL) 325 MG tablet Take 2 tablets (650 mg) by mouth every 6 hours as needed for mild pain or fever  Qty:      Associated Diagnoses: Boaz Barr virus infection      guaiFENesin (ROBITUSSIN) 20 mg/mL SOLN solution Take 10 mLs by mouth every 4 hours as needed for cough  Qty:      Associated Diagnoses: Boaz Barr virus infection      oxyCODONE (ROXICODONE) 5 MG tablet Take 1 tablet (5 mg) by mouth every 4 hours as needed for moderate to severe pain  Qty: 12 tablet, Refills: 0    Associated Diagnoses: Boaz Barr virus infection         CONTINUE these medications which have NOT CHANGED    Details   amphetamine-dextroamphetamine (ADDERALL XR) 25 MG 24 hr capsule Take 25 mg by mouth           Allergies   No Known Allergies  Data   Most Recent 3 CBC's:  Recent Labs   Lab Test 02/13/21  0648 02/12/21  0610 02/11/21  0619   WBC 9.0 8.0 6.0   HGB 12.0* 11.4* 11.4*   MCV 90 89 88    176 162      Most Recent 3 BMP's:  Recent Labs   Lab Test 02/13/21  0648 02/12/21  0610 02/11/21  0619   * 133 136   POTASSIUM 4.5 4.0 3.8   CHLORIDE 101 102 106   CO2 29 26 24   BUN 9 12 16   CR 1.35* 1.17 1.20   ANIONGAP 1* 5 6   AME 7.9* 7.9* 7.7*   * 90 91     Most Recent 2 LFT's:  Recent Labs   Lab Test 02/13/21  0648 02/12/21  0610   * 393*   * 881*   ALKPHOS 226* 217*   BILITOTAL 0.7 0.8     Most Recent INR's and Anticoagulation Dosing History:  Anticoagulation Dose History     Recent Dosing and Labs Latest Ref Rng & Units 2/11/2021 2/12/2021 2/13/2021    INR 0.86 - 1.14 1.38(H) 1.38(H) 1.28(H)        Most  Recent 3 Troponin's:No lab results found.  Most Recent Cholesterol Panel:No lab results found.  Most Recent 6 Bacteria Isolates From Any Culture (See EPIC Reports for Culture Details):  Recent Labs   Lab Test 02/07/21  1215 02/07/21  1108 07/04/16  1836   CULT No growth No growth Moderate growth Beta hemolytic Streptococcus, not group A*     Most Recent TSH, T4 and A1c Labs:No lab results found.  Results for orders placed or performed during the hospital encounter of 02/07/21   XR Chest Port 1 View    Narrative    XR PORTABLE CHEST ONE VIEW   2/7/2021 11:41 AM     HISTORY: Cough, fever.    COMPARISON: None available      Impression    IMPRESSION: AP view of the chest was obtained. Cardiomediastinal  silhouette is within normal limits. No suspicious focal pulmonary  opacities. No significant pleural effusion or pneumothorax.     LOIDA KRUSE MD   Abdomen US, limited (RUQ only)    Narrative    US ABDOMEN LIMITED   2/7/2021 12:41 PM     HISTORY: Transaminitis, fever unknown origin.      COMPARISON: None available    FINDINGS:    Gallbladder: Normal with no cholelithiasis, wall thickening or focal  tenderness.      Bile ducts:  CHD is normal diameter.  No intrahepatic biliary  dilatation. The distal portion of the common bile duct is obscured by  overlying bowel gas.    Liver: Demonstrates normal parenchymal echogenicity. No focal hepatic  mass visualized.    Pancreas:  Partially obscured by overlying bowel gas,  but grossly  unremarkable.     Right kidney:  No hydronephrosis or shadowing calculi.    Aorta and IVC:  Not specifically assessed.       Impression    IMPRESSION:  Normal right upper quadrant abdominal ultrasound.    LOIDA KRUSE MD

## 2021-02-13 NOTE — PROGRESS NOTES
"[facility]  Gastroenterology Progress note      Assessment       Hepatitis presumably EBV with continued drop in LFT's  No nausea or vomiting      Plan    OK for discharge  Question of hepatitis B can be resolved as outpatient but most likely lab error      Interval History     as above      Physical Exam    /69 (BP Location: Right arm)   Pulse 92   Temp 96.4  F (35.8  C) (Oral)   Resp 18   Ht 1.88 m (6' 2\")   Wt 91.6 kg (202 lb)   SpO2 97%   BMI 25.94 kg/m    Temp (24hrs), Av.5  F (37.5  C), Min:96.4  F (35.8  C), Max:102  F (38.9  C)    Patient Vitals for the past 72 hrs:   Weight   02/10/21 2342 91.6 kg (202 lb)       Intake/Output Summary (Last 24 hours) at 2021 1325  Last data filed at 2021 1000  Gross per 24 hour   Intake 720 ml   Output 1250 ml   Net -530 ml         Constitutional: NAD, comfortable  Cardiovascular: RRR, normal S1, S2   Respiratory: CTAB  Abdomen: soft, non-tender, nondistended,  bs+      Additional Comments     ROS, FH, SH: See initial GI consult for details.    Lab Data     Recent Labs   Lab Test 21  0648 21  0610 21  1426 21  0619   WBC 9.0 8.0  --  6.0   HGB 12.0* 11.4*  --  11.4*   MCV 90 89  --  88    176  --  162   INR 1.28* 1.38* 1.38*  --      Recent Labs   Lab Test 21  0648 21  0610 21  0619   * 133 136   POTASSIUM 4.5 4.0 3.8   CHLORIDE 101 102 106   CO2 29 26 24   BUN 9 12 16   CR 1.35* 1.17 1.20   ANIONGAP 1* 5 6   AME 7.9* 7.9* 7.7*     Recent Labs   Lab Test 21  0648 21  0610 21  0619 21  1356 21  1356 21  1107 16  1857   ALBUMIN 2.5* 2.6* 2.7*   < >  --  3.5 3.8   BILITOTAL 0.7 0.8 0.8   < >  --  0.6 0.6   * 881* 1,043*   < >  --  287* 23   * 393* 582*   < >  --  145* 16   ALKPHOS 226* 217* 214*   < >  --  150 62   PROTEIN  --   --   --   --  30*  --   --    LIPASE  --   --   --   --   --  112 96    < > = values in " this interval not displayed.               RODRIGO Osorio MD  [facility]  Office: [phone] call if needed after 5PM or on weekends  Cell: [phone], not available after 5PM at this number

## 2021-02-13 NOTE — PLAN OF CARE
Pt reports feeling ready to discharge. Pt up ad savana. Tylenol for pain. Robitussin for cough. Pt received discharge instructions. Pt verbalized understanding of instructions. No further questions asked.

## 2021-02-13 NOTE — PLAN OF CARE
PRIMARY DIAGNOSIS: EBV hepatitis w/ pain  OUTPATIENT/OBSERVATION GOALS TO BE MET BEFORE DISCHARGE:  1. Pain Status: Improved-controlled with oral pain medications.    2. Return to near baseline physical activity: Yes    3. Cleared for discharge by consultants (if involved): Yes    Discharge Planner Nurse   Safe discharge environment identified: Yes  Barriers to discharge: Yes       Entered by: Cam Worrell 02/13/2021 12:02 PM      A&O x4. Up ad savana. VSS. Lungs dim. Frequent dry cough. Robitussin tylenol and oxycodone for symptom mgmt. Tolerating diet. Voiding without difficulty. Plan is to discharge discharge later per shift if pt feels symptom mgmt is adequate.

## 2021-02-13 NOTE — PROGRESS NOTES
Observation goals PRIOR TO DISCHARGE    Comments:   -diagnostic tests and consults completed and resulted: not met    -vital signs normal or at patient baseline: Met     -tolerating oral intake to maintain hydration ; Met    -adequate pain control on oral analgesics: partially met    -infection is improving: Met    -returns to baseline functional status: Met    -safe disposition plan has been identified: Met    Nurse to notify provider when observation goals have been met and patient is ready for discharge.

## 2021-02-13 NOTE — PLAN OF CARE
" Observation goals PRIOR TO DISCHARGE     Comments: -diagnostic tests and consults completed and resulted ,not met  -vital signs normal or at patient baseline not met  -tolerating oral intake to maintain hydration met  -adequate pain control on oral analgesics met  -infection is improvingmet   -returns to baseline functional status met  -safe disposition plan has been identified met  Pt cont with frequent dry cough. /o of headache from coughing. Cont to spike temps  lungs diminished in bases.  Tolerating diet. Up to bathroom to void. Very anxious about going home. Afraid \"of whats wrong\" talked with pt about concerns  mediation for h/a. To home yarely.          Nurse to notify provider when observation goals have been met and patient is ready for discharge        "

## 2021-02-13 NOTE — PROGRESS NOTES
"Contacted PCP clinic to assist in appointment scheduling in \"3-4 days with LfTS. Outpatient LFTs should be followed until normalized, GI referral outpatient if not normalize\"   Appointment scheduled and added to AVS:     Hospital Follow Up Appointment Scheduled with LFTs:   Tuesday February 16, 2021 at 10:00 AM with Alan PELAYO  PCP: Abisai Cavazos MD  ([phone])   [address] [place] [address]    Colleen Gardner RN, BSN, PHN  [facility]  Inpatient Care Management - FLOAT  Mobile: [phone] 02/13/21 until 4pm  (after today's date, please call the patient's unit)     "

## 2021-02-13 NOTE — PLAN OF CARE
A&O x4, VSS except soft BP's, afebrile, on RA. During previous shift continuous to spikes temps.   C/o headache d/t dry cough. Royal lower lungs diminished. Using urinal at bedside. Pt states to feel anxious going home. Ambulates SBA, tolerates reg diet. Elevated ALT, AST and bilirubin, recheck today AM. Plan discharge today.

## 2021-02-14 LAB
A1AT PHENOTYP SERPL-IMP: NORMAL
A1AT SERPL-MCNC: 200 MG/DL (ref 90–200)

## 2021-02-15 LAB — CERULOPLASMIN SERPL-MCNC: 22 MG/DL (ref 20–60)

## 2021-11-30 NOTE — TELEPHONE ENCOUNTER
FUTURE VISIT INFORMATION      FUTURE VISIT INFORMATION:    Date: 2/23/22    Time: 8:00am    [facility]  REFERRAL INFORMATION:    Referring provider:  self    Referring providers clinic:  N/A    Reason for visit/diagnosis  Keloids    RECORDS REQUESTED FROM:       No recs to collect per pt    
No pertinent past medical history

## 2022-02-23 ENCOUNTER — PRE VISIT (OUTPATIENT)
Dept: SURGERY | Facility: CLINIC | Age: 32
End: 2022-02-23

## 2024-12-17 ENCOUNTER — ANESTHESIA EVENT (OUTPATIENT)
Dept: SURGERY | Facility: CLINIC | Age: 34
DRG: 399 | End: 2024-12-17
Payer: COMMERCIAL

## 2024-12-17 ENCOUNTER — HOSPITAL ENCOUNTER (OUTPATIENT)
Facility: CLINIC | Age: 34
Discharge: HOME | DRG: 399 | End: 2024-12-17
Attending: EMERGENCY MEDICINE | Admitting: STUDENT IN AN ORGANIZED HEALTH CARE EDUCATION/TRAINING PROGRAM
Payer: COMMERCIAL

## 2024-12-17 ENCOUNTER — ANESTHESIA (OUTPATIENT)
Dept: SURGERY | Facility: CLINIC | Age: 34
DRG: 399 | End: 2024-12-17
Payer: COMMERCIAL

## 2024-12-17 ENCOUNTER — APPOINTMENT (OUTPATIENT)
Dept: CT IMAGING | Facility: CLINIC | Age: 34
DRG: 399 | End: 2024-12-17
Attending: EMERGENCY MEDICINE
Payer: COMMERCIAL

## 2024-12-17 VITALS
RESPIRATION RATE: 16 BRPM | HEIGHT: 74 IN | TEMPERATURE: 97.1 F | DIASTOLIC BLOOD PRESSURE: 77 MMHG | WEIGHT: 215 LBS | BODY MASS INDEX: 27.59 KG/M2 | OXYGEN SATURATION: 97 % | SYSTOLIC BLOOD PRESSURE: 105 MMHG | HEART RATE: 65 BPM

## 2024-12-17 DIAGNOSIS — K35.30 ACUTE APPENDICITIS WITH LOCALIZED PERITONITIS, WITHOUT PERFORATION, ABSCESS, OR GANGRENE: ICD-10-CM

## 2024-12-17 LAB
ALBUMIN SERPL BCG-MCNC: 4.4 G/DL (ref 3.5–5.2)
ALBUMIN UR-MCNC: NEGATIVE MG/DL
ALP SERPL-CCNC: 70 U/L (ref 40–150)
ALT SERPL W P-5'-P-CCNC: 27 U/L (ref 0–70)
ANION GAP SERPL CALCULATED.3IONS-SCNC: 12 MMOL/L (ref 7–15)
APPEARANCE UR: CLEAR
AST SERPL W P-5'-P-CCNC: 19 U/L (ref 0–45)
BASOPHILS # BLD AUTO: 0.1 10E3/UL (ref 0–0.2)
BASOPHILS NFR BLD AUTO: 1 %
BILIRUB SERPL-MCNC: 0.3 MG/DL
BILIRUB UR QL STRIP: NEGATIVE
BUN SERPL-MCNC: 15.3 MG/DL (ref 6–20)
CALCIUM SERPL-MCNC: 9.3 MG/DL (ref 8.8–10.4)
CHLORIDE SERPL-SCNC: 102 MMOL/L (ref 98–107)
COLOR UR AUTO: ABNORMAL
CREAT SERPL-MCNC: 1.01 MG/DL (ref 0.67–1.17)
EGFRCR SERPLBLD CKD-EPI 2021: >90 ML/MIN/1.73M2
EOSINOPHIL # BLD AUTO: 1 10E3/UL (ref 0–0.7)
EOSINOPHIL NFR BLD AUTO: 12 %
ERYTHROCYTE [DISTWIDTH] IN BLOOD BY AUTOMATED COUNT: 12.3 % (ref 10–15)
GLUCOSE SERPL-MCNC: 101 MG/DL (ref 70–99)
GLUCOSE UR STRIP-MCNC: NEGATIVE MG/DL
HCO3 SERPL-SCNC: 25 MMOL/L (ref 22–29)
HCT VFR BLD AUTO: 41.9 % (ref 40–53)
HGB BLD-MCNC: 14.1 G/DL (ref 13.3–17.7)
HGB UR QL STRIP: NEGATIVE
IMM GRANULOCYTES # BLD: 0 10E3/UL
IMM GRANULOCYTES NFR BLD: 0 %
KETONES UR STRIP-MCNC: NEGATIVE MG/DL
LEUKOCYTE ESTERASE UR QL STRIP: NEGATIVE
LIPASE SERPL-CCNC: 28 U/L (ref 13–60)
LYMPHOCYTES # BLD AUTO: 2.3 10E3/UL (ref 0.8–5.3)
LYMPHOCYTES NFR BLD AUTO: 30 %
MCH RBC QN AUTO: 30.5 PG (ref 26.5–33)
MCHC RBC AUTO-ENTMCNC: 33.7 G/DL (ref 31.5–36.5)
MCV RBC AUTO: 91 FL (ref 78–100)
MONOCYTES # BLD AUTO: 0.5 10E3/UL (ref 0–1.3)
MONOCYTES NFR BLD AUTO: 7 %
MUCOUS THREADS #/AREA URNS LPF: PRESENT /LPF
NEUTROPHILS # BLD AUTO: 3.7 10E3/UL (ref 1.6–8.3)
NEUTROPHILS NFR BLD AUTO: 49 %
NITRATE UR QL: NEGATIVE
NRBC # BLD AUTO: 0 10E3/UL
NRBC BLD AUTO-RTO: 0 /100
PH UR STRIP: 6.5 [PH] (ref 5–7)
PLATELET # BLD AUTO: 284 10E3/UL (ref 150–450)
POTASSIUM SERPL-SCNC: 4.1 MMOL/L (ref 3.4–5.3)
PROT SERPL-MCNC: 7.2 G/DL (ref 6.4–8.3)
RBC # BLD AUTO: 4.63 10E6/UL (ref 4.4–5.9)
RBC URINE: 1 /HPF
SODIUM SERPL-SCNC: 139 MMOL/L (ref 135–145)
SP GR UR STRIP: 1.02 (ref 1–1.03)
SQUAMOUS EPITHELIAL: <1 /HPF
UROBILINOGEN UR STRIP-MCNC: NORMAL MG/DL
WBC # BLD AUTO: 7.7 10E3/UL (ref 4–11)
WBC URINE: 1 /HPF

## 2024-12-17 PROCEDURE — 85025 COMPLETE CBC W/AUTO DIFF WBC: CPT | Performed by: EMERGENCY MEDICINE

## 2024-12-17 PROCEDURE — 999N000141 HC STATISTIC PRE-PROCEDURE NURSING ASSESSMENT: Performed by: STUDENT IN AN ORGANIZED HEALTH CARE EDUCATION/TRAINING PROGRAM

## 2024-12-17 PROCEDURE — 74177 CT ABD & PELVIS W/CONTRAST: CPT

## 2024-12-17 PROCEDURE — 250N000011 HC RX IP 250 OP 636: Performed by: NURSE ANESTHETIST, CERTIFIED REGISTERED

## 2024-12-17 PROCEDURE — 710N000009 HC RECOVERY PHASE 1, LEVEL 1, PER MIN: Performed by: STUDENT IN AN ORGANIZED HEALTH CARE EDUCATION/TRAINING PROGRAM

## 2024-12-17 PROCEDURE — 88304 TISSUE EXAM BY PATHOLOGIST: CPT | Mod: TC | Performed by: STUDENT IN AN ORGANIZED HEALTH CARE EDUCATION/TRAINING PROGRAM

## 2024-12-17 PROCEDURE — 82947 ASSAY GLUCOSE BLOOD QUANT: CPT | Performed by: EMERGENCY MEDICINE

## 2024-12-17 PROCEDURE — 272N000001 HC OR GENERAL SUPPLY STERILE: Performed by: STUDENT IN AN ORGANIZED HEALTH CARE EDUCATION/TRAINING PROGRAM

## 2024-12-17 PROCEDURE — 360N000076 HC SURGERY LEVEL 3, PER MIN: Performed by: STUDENT IN AN ORGANIZED HEALTH CARE EDUCATION/TRAINING PROGRAM

## 2024-12-17 PROCEDURE — 250N000011 HC RX IP 250 OP 636: Performed by: EMERGENCY MEDICINE

## 2024-12-17 PROCEDURE — 250N000009 HC RX 250: Performed by: NURSE ANESTHETIST, CERTIFIED REGISTERED

## 2024-12-17 PROCEDURE — 0DTJ4ZZ RESECTION OF APPENDIX, PERCUTANEOUS ENDOSCOPIC APPROACH: ICD-10-PCS | Performed by: STUDENT IN AN ORGANIZED HEALTH CARE EDUCATION/TRAINING PROGRAM

## 2024-12-17 PROCEDURE — 99285 EMERGENCY DEPT VISIT HI MDM: CPT | Mod: 25

## 2024-12-17 PROCEDURE — 82435 ASSAY OF BLOOD CHLORIDE: CPT | Performed by: EMERGENCY MEDICINE

## 2024-12-17 PROCEDURE — 81003 URINALYSIS AUTO W/O SCOPE: CPT | Performed by: EMERGENCY MEDICINE

## 2024-12-17 PROCEDURE — 250N000025 HC SEVOFLURANE, PER MIN: Performed by: STUDENT IN AN ORGANIZED HEALTH CARE EDUCATION/TRAINING PROGRAM

## 2024-12-17 PROCEDURE — 250N000009 HC RX 250: Performed by: EMERGENCY MEDICINE

## 2024-12-17 PROCEDURE — 258N000003 HC RX IP 258 OP 636: Performed by: NURSE ANESTHETIST, CERTIFIED REGISTERED

## 2024-12-17 PROCEDURE — 36415 COLL VENOUS BLD VENIPUNCTURE: CPT | Performed by: EMERGENCY MEDICINE

## 2024-12-17 PROCEDURE — 710N000012 HC RECOVERY PHASE 2, PER MINUTE: Performed by: STUDENT IN AN ORGANIZED HEALTH CARE EDUCATION/TRAINING PROGRAM

## 2024-12-17 PROCEDURE — 44970 LAPAROSCOPY APPENDECTOMY: CPT | Performed by: STUDENT IN AN ORGANIZED HEALTH CARE EDUCATION/TRAINING PROGRAM

## 2024-12-17 PROCEDURE — 370N000017 HC ANESTHESIA TECHNICAL FEE, PER MIN: Performed by: STUDENT IN AN ORGANIZED HEALTH CARE EDUCATION/TRAINING PROGRAM

## 2024-12-17 PROCEDURE — 83690 ASSAY OF LIPASE: CPT | Performed by: EMERGENCY MEDICINE

## 2024-12-17 PROCEDURE — 250N000009 HC RX 250: Performed by: STUDENT IN AN ORGANIZED HEALTH CARE EDUCATION/TRAINING PROGRAM

## 2024-12-17 RX ORDER — AMOXICILLIN 250 MG
1-2 CAPSULE ORAL 2 TIMES DAILY PRN
Qty: 15 TABLET | Refills: 0 | Status: SHIPPED | OUTPATIENT
Start: 2024-12-17

## 2024-12-17 RX ORDER — PIPERACILLIN SODIUM, TAZOBACTAM SODIUM 4; .5 G/20ML; G/20ML
4.5 INJECTION, POWDER, LYOPHILIZED, FOR SOLUTION INTRAVENOUS ONCE
Status: COMPLETED | OUTPATIENT
Start: 2024-12-17 | End: 2024-12-17

## 2024-12-17 RX ORDER — SODIUM CHLORIDE, SODIUM LACTATE, POTASSIUM CHLORIDE, CALCIUM CHLORIDE 600; 310; 30; 20 MG/100ML; MG/100ML; MG/100ML; MG/100ML
INJECTION, SOLUTION INTRAVENOUS CONTINUOUS
Status: CANCELLED | OUTPATIENT
Start: 2024-12-17

## 2024-12-17 RX ORDER — DEXAMETHASONE SODIUM PHOSPHATE 4 MG/ML
INJECTION, SOLUTION INTRA-ARTICULAR; INTRALESIONAL; INTRAMUSCULAR; INTRAVENOUS; SOFT TISSUE PRN
Status: DISCONTINUED | OUTPATIENT
Start: 2024-12-17 | End: 2024-12-17

## 2024-12-17 RX ORDER — LIDOCAINE HYDROCHLORIDE 20 MG/ML
INJECTION, SOLUTION INFILTRATION; PERINEURAL PRN
Status: DISCONTINUED | OUTPATIENT
Start: 2024-12-17 | End: 2024-12-17

## 2024-12-17 RX ORDER — PROCHLORPERAZINE MALEATE 10 MG
10 TABLET ORAL EVERY 6 HOURS PRN
Status: DISCONTINUED | OUTPATIENT
Start: 2024-12-17 | End: 2024-12-17 | Stop reason: HOSPADM

## 2024-12-17 RX ORDER — IOPAMIDOL 755 MG/ML
109 INJECTION, SOLUTION INTRAVASCULAR ONCE
Status: COMPLETED | OUTPATIENT
Start: 2024-12-17 | End: 2024-12-17

## 2024-12-17 RX ORDER — ONDANSETRON 2 MG/ML
INJECTION INTRAMUSCULAR; INTRAVENOUS PRN
Status: DISCONTINUED | OUTPATIENT
Start: 2024-12-17 | End: 2024-12-17

## 2024-12-17 RX ORDER — METHOCARBAMOL 750 MG/1
750 TABLET, FILM COATED ORAL 4 TIMES DAILY PRN
Qty: 15 TABLET | Refills: 0 | Status: SHIPPED | OUTPATIENT
Start: 2024-12-17 | End: 2024-12-19

## 2024-12-17 RX ORDER — FENTANYL CITRATE 50 UG/ML
INJECTION, SOLUTION INTRAMUSCULAR; INTRAVENOUS PRN
Status: DISCONTINUED | OUTPATIENT
Start: 2024-12-17 | End: 2024-12-17

## 2024-12-17 RX ORDER — FENTANYL CITRATE 0.05 MG/ML
50 INJECTION, SOLUTION INTRAMUSCULAR; INTRAVENOUS
Status: DISCONTINUED | OUTPATIENT
Start: 2024-12-17 | End: 2024-12-17 | Stop reason: HOSPADM

## 2024-12-17 RX ORDER — ONDANSETRON 4 MG/1
4 TABLET, ORALLY DISINTEGRATING ORAL EVERY 30 MIN PRN
Status: DISCONTINUED | OUTPATIENT
Start: 2024-12-17 | End: 2024-12-17 | Stop reason: HOSPADM

## 2024-12-17 RX ORDER — ACETAMINOPHEN 650 MG/1
650 SUPPOSITORY RECTAL EVERY 4 HOURS PRN
Status: DISCONTINUED | OUTPATIENT
Start: 2024-12-17 | End: 2024-12-17 | Stop reason: HOSPADM

## 2024-12-17 RX ORDER — PROPOFOL 10 MG/ML
INJECTION, EMULSION INTRAVENOUS CONTINUOUS PRN
Status: DISCONTINUED | OUTPATIENT
Start: 2024-12-17 | End: 2024-12-17

## 2024-12-17 RX ORDER — FLUMAZENIL 0.1 MG/ML
0.2 INJECTION, SOLUTION INTRAVENOUS
Status: DISCONTINUED | OUTPATIENT
Start: 2024-12-17 | End: 2024-12-17 | Stop reason: HOSPADM

## 2024-12-17 RX ORDER — HYDROCODONE BITARTRATE AND ACETAMINOPHEN 5; 325 MG/1; MG/1
1-2 TABLET ORAL EVERY 4 HOURS PRN
Qty: 10 TABLET | Refills: 0 | Status: SHIPPED | OUTPATIENT
Start: 2024-12-17 | End: 2024-12-19 | Stop reason: ALTCHOICE

## 2024-12-17 RX ORDER — DEXMEDETOMIDINE HYDROCHLORIDE 4 UG/ML
INJECTION, SOLUTION INTRAVENOUS PRN
Status: DISCONTINUED | OUTPATIENT
Start: 2024-12-17 | End: 2024-12-17

## 2024-12-17 RX ORDER — FENTANYL CITRATE 50 UG/ML
50 INJECTION, SOLUTION INTRAMUSCULAR; INTRAVENOUS EVERY 5 MIN PRN
Status: DISCONTINUED | OUTPATIENT
Start: 2024-12-17 | End: 2024-12-17 | Stop reason: HOSPADM

## 2024-12-17 RX ORDER — SODIUM CHLORIDE, SODIUM LACTATE, POTASSIUM CHLORIDE, CALCIUM CHLORIDE 600; 310; 30; 20 MG/100ML; MG/100ML; MG/100ML; MG/100ML
INJECTION, SOLUTION INTRAVENOUS CONTINUOUS PRN
Status: DISCONTINUED | OUTPATIENT
Start: 2024-12-17 | End: 2024-12-17

## 2024-12-17 RX ORDER — DEXAMETHASONE SODIUM PHOSPHATE 4 MG/ML
4 INJECTION, SOLUTION INTRA-ARTICULAR; INTRALESIONAL; INTRAMUSCULAR; INTRAVENOUS; SOFT TISSUE
Status: DISCONTINUED | OUTPATIENT
Start: 2024-12-17 | End: 2024-12-17 | Stop reason: HOSPADM

## 2024-12-17 RX ORDER — MULTIVITAMIN,THERAPEUTIC
1 TABLET ORAL DAILY
COMMUNITY

## 2024-12-17 RX ORDER — OXYCODONE HYDROCHLORIDE 5 MG/1
5 TABLET ORAL EVERY 4 HOURS PRN
Status: DISCONTINUED | OUTPATIENT
Start: 2024-12-17 | End: 2024-12-17 | Stop reason: HOSPADM

## 2024-12-17 RX ORDER — HYDROMORPHONE HCL IN WATER/PF 6 MG/30 ML
0.2 PATIENT CONTROLLED ANALGESIA SYRINGE INTRAVENOUS EVERY 5 MIN PRN
Status: DISCONTINUED | OUTPATIENT
Start: 2024-12-17 | End: 2024-12-17 | Stop reason: HOSPADM

## 2024-12-17 RX ORDER — NALOXONE HYDROCHLORIDE 0.4 MG/ML
0.1 INJECTION, SOLUTION INTRAMUSCULAR; INTRAVENOUS; SUBCUTANEOUS
Status: DISCONTINUED | OUTPATIENT
Start: 2024-12-17 | End: 2024-12-17 | Stop reason: HOSPADM

## 2024-12-17 RX ORDER — HYDROMORPHONE HCL IN WATER/PF 6 MG/30 ML
0.2 PATIENT CONTROLLED ANALGESIA SYRINGE INTRAVENOUS
Status: DISCONTINUED | OUTPATIENT
Start: 2024-12-17 | End: 2024-12-17 | Stop reason: HOSPADM

## 2024-12-17 RX ORDER — KETOROLAC TROMETHAMINE 30 MG/ML
INJECTION, SOLUTION INTRAMUSCULAR; INTRAVENOUS PRN
Status: DISCONTINUED | OUTPATIENT
Start: 2024-12-17 | End: 2024-12-17

## 2024-12-17 RX ORDER — HYDROMORPHONE HCL IN WATER/PF 6 MG/30 ML
0.4 PATIENT CONTROLLED ANALGESIA SYRINGE INTRAVENOUS EVERY 5 MIN PRN
Status: DISCONTINUED | OUTPATIENT
Start: 2024-12-17 | End: 2024-12-17 | Stop reason: HOSPADM

## 2024-12-17 RX ORDER — ACETAMINOPHEN 325 MG/1
650 TABLET ORAL EVERY 4 HOURS PRN
Status: DISCONTINUED | OUTPATIENT
Start: 2024-12-17 | End: 2024-12-17 | Stop reason: HOSPADM

## 2024-12-17 RX ORDER — ONDANSETRON 4 MG/1
4 TABLET, ORALLY DISINTEGRATING ORAL EVERY 6 HOURS PRN
Status: DISCONTINUED | OUTPATIENT
Start: 2024-12-17 | End: 2024-12-17 | Stop reason: HOSPADM

## 2024-12-17 RX ORDER — LIDOCAINE 40 MG/G
CREAM TOPICAL
Status: DISCONTINUED | OUTPATIENT
Start: 2024-12-17 | End: 2024-12-17 | Stop reason: HOSPADM

## 2024-12-17 RX ORDER — PROPOFOL 10 MG/ML
INJECTION, EMULSION INTRAVENOUS PRN
Status: DISCONTINUED | OUTPATIENT
Start: 2024-12-17 | End: 2024-12-17

## 2024-12-17 RX ORDER — CEFAZOLIN SODIUM 2 G/100ML
2 INJECTION, SOLUTION INTRAVENOUS
Status: CANCELLED | OUTPATIENT
Start: 2024-12-17

## 2024-12-17 RX ORDER — HYDROMORPHONE HCL IN WATER/PF 6 MG/30 ML
0.4 PATIENT CONTROLLED ANALGESIA SYRINGE INTRAVENOUS
Status: DISCONTINUED | OUTPATIENT
Start: 2024-12-17 | End: 2024-12-17 | Stop reason: HOSPADM

## 2024-12-17 RX ORDER — ONDANSETRON 2 MG/ML
4 INJECTION INTRAMUSCULAR; INTRAVENOUS EVERY 30 MIN PRN
Status: DISCONTINUED | OUTPATIENT
Start: 2024-12-17 | End: 2024-12-17 | Stop reason: HOSPADM

## 2024-12-17 RX ORDER — SODIUM CHLORIDE, SODIUM LACTATE, POTASSIUM CHLORIDE, CALCIUM CHLORIDE 600; 310; 30; 20 MG/100ML; MG/100ML; MG/100ML; MG/100ML
INJECTION, SOLUTION INTRAVENOUS CONTINUOUS
Status: DISCONTINUED | OUTPATIENT
Start: 2024-12-17 | End: 2024-12-17 | Stop reason: HOSPADM

## 2024-12-17 RX ORDER — FENTANYL CITRATE 50 UG/ML
25 INJECTION, SOLUTION INTRAMUSCULAR; INTRAVENOUS EVERY 5 MIN PRN
Status: DISCONTINUED | OUTPATIENT
Start: 2024-12-17 | End: 2024-12-17 | Stop reason: HOSPADM

## 2024-12-17 RX ORDER — PIPERACILLIN SODIUM, TAZOBACTAM SODIUM 3; .375 G/15ML; G/15ML
3.38 INJECTION, POWDER, LYOPHILIZED, FOR SOLUTION INTRAVENOUS EVERY 6 HOURS
Status: DISCONTINUED | OUTPATIENT
Start: 2024-12-17 | End: 2024-12-17 | Stop reason: HOSPADM

## 2024-12-17 RX ORDER — BUPIVACAINE HYDROCHLORIDE AND EPINEPHRINE 5; 5 MG/ML; UG/ML
INJECTION, SOLUTION PERINEURAL PRN
Status: DISCONTINUED | OUTPATIENT
Start: 2024-12-17 | End: 2024-12-17 | Stop reason: HOSPADM

## 2024-12-17 RX ORDER — CEFAZOLIN SODIUM 2 G/100ML
2 INJECTION, SOLUTION INTRAVENOUS SEE ADMIN INSTRUCTIONS
Status: CANCELLED | OUTPATIENT
Start: 2024-12-17

## 2024-12-17 RX ORDER — HYDROCODONE BITARTRATE AND ACETAMINOPHEN 5; 325 MG/1; MG/1
1-2 TABLET ORAL
Status: DISCONTINUED | OUTPATIENT
Start: 2024-12-17 | End: 2024-12-17 | Stop reason: HOSPADM

## 2024-12-17 RX ORDER — CEFAZOLIN SODIUM 500 MG/2.2ML
INJECTION, POWDER, FOR SOLUTION INTRAMUSCULAR; INTRAVENOUS PRN
Status: DISCONTINUED | OUTPATIENT
Start: 2024-12-17 | End: 2024-12-17

## 2024-12-17 RX ORDER — ONDANSETRON 2 MG/ML
4 INJECTION INTRAMUSCULAR; INTRAVENOUS EVERY 6 HOURS PRN
Status: DISCONTINUED | OUTPATIENT
Start: 2024-12-17 | End: 2024-12-17 | Stop reason: HOSPADM

## 2024-12-17 RX ADMIN — HYDROMORPHONE HYDROCHLORIDE 0.5 MG: 1 INJECTION, SOLUTION INTRAMUSCULAR; INTRAVENOUS; SUBCUTANEOUS at 14:13

## 2024-12-17 RX ADMIN — SODIUM CHLORIDE 71 ML: 9 INJECTION, SOLUTION INTRAVENOUS at 06:27

## 2024-12-17 RX ADMIN — ONDANSETRON 4 MG: 2 INJECTION INTRAMUSCULAR; INTRAVENOUS at 13:35

## 2024-12-17 RX ADMIN — LIDOCAINE HYDROCHLORIDE 100 MG: 20 INJECTION, SOLUTION INFILTRATION; PERINEURAL at 12:58

## 2024-12-17 RX ADMIN — IOPAMIDOL 109 ML: 755 INJECTION, SOLUTION INTRAVENOUS at 06:25

## 2024-12-17 RX ADMIN — DEXAMETHASONE SODIUM PHOSPHATE 4 MG: 4 INJECTION, SOLUTION INTRA-ARTICULAR; INTRALESIONAL; INTRAMUSCULAR; INTRAVENOUS; SOFT TISSUE at 13:11

## 2024-12-17 RX ADMIN — SODIUM CHLORIDE, POTASSIUM CHLORIDE, SODIUM LACTATE AND CALCIUM CHLORIDE: 600; 310; 30; 20 INJECTION, SOLUTION INTRAVENOUS at 13:36

## 2024-12-17 RX ADMIN — FENTANYL CITRATE 50 MCG: 50 INJECTION INTRAMUSCULAR; INTRAVENOUS at 13:18

## 2024-12-17 RX ADMIN — ROCURONIUM BROMIDE 50 MG: 50 INJECTION, SOLUTION INTRAVENOUS at 13:00

## 2024-12-17 RX ADMIN — KETOROLAC TROMETHAMINE 30 MG: 30 INJECTION, SOLUTION INTRAMUSCULAR at 13:43

## 2024-12-17 RX ADMIN — FENTANYL CITRATE 50 MCG: 50 INJECTION INTRAMUSCULAR; INTRAVENOUS at 12:59

## 2024-12-17 RX ADMIN — PROPOFOL 50 MG: 10 INJECTION, EMULSION INTRAVENOUS at 13:42

## 2024-12-17 RX ADMIN — SODIUM CHLORIDE, POTASSIUM CHLORIDE, SODIUM LACTATE AND CALCIUM CHLORIDE: 600; 310; 30; 20 INJECTION, SOLUTION INTRAVENOUS at 12:54

## 2024-12-17 RX ADMIN — PROPOFOL 200 MG: 10 INJECTION, EMULSION INTRAVENOUS at 13:00

## 2024-12-17 RX ADMIN — MIDAZOLAM 2 MG: 1 INJECTION INTRAMUSCULAR; INTRAVENOUS at 12:54

## 2024-12-17 RX ADMIN — ROCURONIUM BROMIDE 10 MG: 50 INJECTION, SOLUTION INTRAVENOUS at 13:45

## 2024-12-17 RX ADMIN — Medication 200 MG: at 13:45

## 2024-12-17 RX ADMIN — FENTANYL CITRATE 50 MCG: 50 INJECTION INTRAMUSCULAR; INTRAVENOUS at 13:21

## 2024-12-17 RX ADMIN — PROPOFOL 200 MCG/KG/MIN: 10 INJECTION, EMULSION INTRAVENOUS at 13:10

## 2024-12-17 RX ADMIN — PIPERACILLIN AND TAZOBACTAM 4.5 G: 4; .5 INJECTION, POWDER, FOR SOLUTION INTRAVENOUS at 10:28

## 2024-12-17 RX ADMIN — HYDROMORPHONE HYDROCHLORIDE 0.3 MG: 1 INJECTION, SOLUTION INTRAMUSCULAR; INTRAVENOUS; SUBCUTANEOUS at 13:29

## 2024-12-17 RX ADMIN — HYDROMORPHONE HYDROCHLORIDE 0.2 MG: 1 INJECTION, SOLUTION INTRAMUSCULAR; INTRAVENOUS; SUBCUTANEOUS at 13:39

## 2024-12-17 RX ADMIN — FENTANYL CITRATE 50 MCG: 50 INJECTION INTRAMUSCULAR; INTRAVENOUS at 12:58

## 2024-12-17 RX ADMIN — CEFAZOLIN 1000 MG: 225 INJECTION, POWDER, FOR SOLUTION INTRAMUSCULAR; INTRAVENOUS at 13:07

## 2024-12-17 RX ADMIN — DEXMEDETOMIDINE HYDROCHLORIDE 20 MCG: 200 INJECTION INTRAVENOUS at 13:11

## 2024-12-17 ASSESSMENT — ACTIVITIES OF DAILY LIVING (ADL)
ADLS_ACUITY_SCORE: 51

## 2024-12-17 ASSESSMENT — COLUMBIA-SUICIDE SEVERITY RATING SCALE - C-SSRS
2. HAVE YOU ACTUALLY HAD ANY THOUGHTS OF KILLING YOURSELF IN THE PAST MONTH?: NO
1. IN THE PAST MONTH, HAVE YOU WISHED YOU WERE DEAD OR WISHED YOU COULD GO TO SLEEP AND NOT WAKE UP?: NO
6. HAVE YOU EVER DONE ANYTHING, STARTED TO DO ANYTHING, OR PREPARED TO DO ANYTHING TO END YOUR LIFE?: NO

## 2024-12-17 ASSESSMENT — COPD QUESTIONNAIRES: COPD: 0

## 2024-12-17 ASSESSMENT — LIFESTYLE VARIABLES: TOBACCO_USE: 0

## 2024-12-17 NOTE — ED PROVIDER NOTES
"  Emergency Department Note      History of Present Illness     Chief Complaint   Abdominal Pain      HPI   Angelo James is a 34 year old male with a history of peptic ulcer and chronic gastritis who presents to the ED for abdominal pain. The patient reports developing upper abdominal pain 3 days ago. He describes this pain as feeling similar to a bruise, and adds that it is much different from a previous ulcer he had 10 years ago. The next day, his abdominal pain migrated to his lower abdomen, right above his scrotum. He now has dull intermittent pain every 5-10 seconds. He adds that at night, when he changes positions, his pain will worsen. Patient endorses mild nausea, but denies any vomiting, constipation, diarrhea, blood in his stool, testicular swelling, or new back pain. Further denies any previous abdominal surgeries or excessive use of ibuprofen.    Independent Historian   None      Past Medical History     Medical History and Problem List   ADHD  Chronic gastritis  MDD  GRECIA  Peptic ulcer  Pes planus of both feet  Seasonal allergies  EBV hepatitis    Medications   Adderall     Surgical History   No past surgical history on file.    Physical Exam     Patient Vitals for the past 24 hrs:   BP Temp Temp src Pulse Resp SpO2 Height Weight   12/17/24 0507 127/80 97.8  F (36.6  C) Oral 71 18 99 % 1.88 m (6' 2\") 97.5 kg (215 lb)     Physical Exam  Eyes:  The pupils are equal and round    Conjunctivae and sclerae are normal  ENT:    The nose is normal    Pinnae are normal  CV:  Regular rate and rhythm     No edema  Resp:  Lungs are clear    Non-labored    No rales    No wheezing   GI:  Abdomen is soft with tenderness of the bilateral lower abdominal area worse on the right than the left, there is no rigidity    No distension    No rebound tenderness   :  No testicular tenderness  Rectal: Tenderness with rectal exam but did not reproduce symtpoms  MS:  Normal muscular tone    No asymmetric leg " swelling  Skin:  No rash or acute skin lesions noted  Neuro:   Awake, alert.      Speech is normal and fluent.    Face is symmetric.     Moves all extremities        Diagnostics     Lab Results   Labs Ordered and Resulted from Time of ED Arrival to Time of ED Departure   COMPREHENSIVE METABOLIC PANEL - Abnormal       Result Value    Sodium 139      Potassium 4.1      Carbon Dioxide (CO2) 25      Anion Gap 12      Urea Nitrogen 15.3      Creatinine 1.01      GFR Estimate >90      Calcium 9.3      Chloride 102      Glucose 101 (*)     Alkaline Phosphatase 70      AST 19      ALT 27      Protein Total 7.2      Albumin 4.4      Bilirubin Total 0.3     ROUTINE UA WITH MICROSCOPIC REFLEX TO CULTURE - Abnormal    Color Urine Light Yellow      Appearance Urine Clear      Glucose Urine Negative      Bilirubin Urine Negative      Ketones Urine Negative      Specific Gravity Urine 1.025      Blood Urine Negative      pH Urine 6.5      Protein Albumin Urine Negative      Urobilinogen Urine Normal      Nitrite Urine Negative      Leukocyte Esterase Urine Negative      Mucus Urine Present (*)     RBC Urine 1      WBC Urine 1      Squamous Epithelials Urine <1     CBC WITH PLATELETS AND DIFFERENTIAL - Abnormal    WBC Count 7.7      RBC Count 4.63      Hemoglobin 14.1      Hematocrit 41.9      MCV 91      MCH 30.5      MCHC 33.7      RDW 12.3      Platelet Count 284      % Neutrophils 49      % Lymphocytes 30      % Monocytes 7      % Eosinophils 12      % Basophils 1      % Immature Granulocytes 0      NRBCs per 100 WBC 0      Absolute Neutrophils 3.7      Absolute Lymphocytes 2.3      Absolute Monocytes 0.5      Absolute Eosinophils 1.0 (*)     Absolute Basophils 0.1      Absolute Immature Granulocytes 0.0      Absolute NRBCs 0.0     LIPASE - Normal    Lipase 28         Imaging   CT Abdomen Pelvis w Contrast   Final Result   IMPRESSION:    1.  Borderline diameter retrocecal appendix right lower quadrant with mild wall enhancement  but no other periappendiceal inflammation to confirm appendicitis. Early/developing appendicitis not excluded. Suggest surgical consultation and depending on    clinical scenario, could consider close clinical and/or imaging surveillance.   2.  Abdominal pelvic CT otherwise negative.        Independent Interpretation   None    ED Course      Medications Administered   Medications   iopamidol (ISOVUE-370) solution 109 mL (109 mLs Intravenous $Given 12/17/24 0625)   Saline (71 mLs Intravenous $Given 12/17/24 0627)       Procedures   Procedures     Discussion of Management   Surgery, Pending    ED Course   ED Course as of 12/17/24 0557   Tue Dec 17, 2024   0511 I obtained history and examined the patient as noted above.         Additional Documentation  None    Medical Decision Making / Diagnosis     CMS Diagnoses: None    MIPS       None    [facility]   Angelo James is a 34 year old male who presents to the emergency department with concerns about abdominal pain.  Symptoms started 3 days ago with feeling of a achy punch like sensation in his upper abdomen.  Yesterday and today he noticed some increasing pain down into his pelvis.  Pain is worse on the right side.  On exam he also noted that there was some feeling that his testicles might be sore, but when he is examined them some self he has not noticed any tenderness.  No tenderness on my exam either.  No signs of swelling.  He was uncomfortable with rectal exam as prostatitis was considered.  He reported that the pain was not similar to what his abdomen was experiencing though.  CT scan was performed.  CT scan showed dilated appendix with some mild inflammatory thickening of the appendix but no surrounding inflammation.  This is worrisome for possible appendicitis and would correlate with his symptoms.  Surgery was pending return call at time of sign-out to my partner.      Disposition   Care of the patient was transferred to my colleague Dr. Sosa pending surgery  consultation.     Diagnosis     ICD-10-CM    1. Acute appendicitis with localized peritonitis, without perforation, abscess, or gangrene  K35.30                  Scribe Disclosure:  I, Savannah Genao, am serving as a scribe at 5:20 AM on 12/17/2024 to document services personally performed by Ambrose Alfonso MD based on my observations and the provider's statements to me.        Ambrose Alfonso MD  12/17/24 0817

## 2024-12-17 NOTE — DISCHARGE INSTRUCTIONS
[facility] - [facility] CONSULTANTS  Discharge Instructions: Post-Operative Appendectomy    ACTIVITY  Expect to feel tired after your surgery.  This will gradually resolve.    Take frequent, short walks and increase your activity gradually.    Avoid strenuous physical activity or heavy lifting greater than 15-20 lbs. for 2-3 weeks.  You may climb stairs.  You may drive without restrictions when you are not using any prescription pain medication and feel comfortable in a car.  You may return to work/school when you are comfortable without any prescription pain medication.    WOUND CARE  You may remove your outer dressing or Band-Aids and shower 48 hours after the surgery.  Pat your incisions dry and leave them open to air.  Re-apply dressing (Band-Aids or gauze/tape) as needed for comfort or drainage.  You may have steri-strips (looks like white tape) on your incision.  You may peel off the steri-strips 2 weeks after your surgery if they have not peeled off on their own.  Do not soak your incisions in a tub or pool for 2 weeks.   Do not apply any lotions, creams, or ointments to your incisions.  A ridge under your incisions is normal and will gradually resolve.    DIET  Start with liquids, then gradually resume your regular diet as tolerated.  Avoid heavy, spicy, and greasy meals for 2-3 days.  Drink plenty of fluids to stay hydrated.    PAIN  Expect some tenderness and discomfort at the incision sites.  Use the prescribed pain medication / muscle relaxant at your discretion.  Expect gradual resolution of your pain over several days.  You may take ibuprofen with food (unless you have been told not to) or acetaminophen/Tylenol instead of or in addition to your prescribed pain medication.  However, if you are taking Norco, do not take any additional acetaminophen/Tylenol.  Do not drink alcohol or drive while you are taking pain medications.  You may apply ice to your incisions in 20 minute intervals as needed for  the next 48 hours.  After that time, consider switching to heat if you prefer.    EXPECTATIONS  Pain medications can cause constipation.  Limit use when possible.  Take an over the counter or prescribed stool softener/stimulant, such as Senna-Docusate 1-2 times a day with plenty of water.  You may take a mild over the counter laxative, such as Miralax or a Dulcolax suppository, as needed.  You make discontinue these medications once you are having regular bowel movements and/or are no longer taking your narcotic pain medication.   You may have shoulder or upper back discomfort due to the gas used in surgery.  This is temporary and should resolve in 48-72 hours.  Short, frequent walks may help with this.  If you are unable to urinate for 8 hours or feel as though you are not emptying your bladder adequately, we recommend you seek care at an ER or Urgent Care facility for possible catheter placement.     FOLLOW UP  Our office will contact you in approximately 2-3 weeks to check on your progress and answer any questions you may have.  If you are doing well, you will not need to return for a follow up appointment.  If any concerns are identified over the phone, we will help you make an appointment to see a provider.   If you have not received a phone call, have any questions or concerns, or would like to be seen, please call us at [phone] and ask to speak with our nurse.  We are located at [address] [place] [address].    CALL OUR OFFICE -[phone] IF YOU HAVE:   Chills or fever above 101 F.  Increased redness, warmth, or drainage at your incisions.  Significant bleeding.  Pain not relieved by your pain medication or rest.  Increasing pain after the first 48 hours.  Any other concerns or questions.                Same Day Surgery Discharge Instructions for  Sedation and General Anesthesia     It's not unusual to feel dizzy, light-headed or faint for up to 24 hours after surgery or while  taking pain medication.  If you have these symptoms: sit for a few minutes before standing and have someone assist you when you get up to walk or use the bathroom.    You should rest and relax for the next 24 hours. We recommend you make arrangements to have an adult stay with you for at least 24 hours after your discharge.  Avoid hazardous and strenuous activity.    DO NOT DRIVE any vehicle or operate mechanical equipment for 24 hours following the end of your surgery.  Even though you may feel normal, your reactions may be affected by the medication you have received.    Do not drink alcoholic beverages for 24 hours following surgery.     Slowly progress to your regular diet as you feel able. It's not unusual to feel nauseated and/or vomit after receiving anesthesia.  If you develop these symptoms, drink clear liquids (apple juice, ginger ale, broth, 7-up, etc. ) until you feel better.  If your nausea and vomiting persists for 24 hours, please notify your surgeon.      All narcotic pain medications, along with inactivity and anesthesia, can cause constipation. Drinking plenty of liquids and increasing fiber intake will help.    For any questions of a medical nature, call your surgeon.    Do not make important decisions for 24 hours.    If you had general anesthesia, you may have a sore throat for a couple of days related to the breathing tube used during surgery.  You may use Cepacol lozenges to help with this discomfort.  If it worsens or if you develop a fever, contact your surgeon.     If you feel your pain is not well managed with the pain medications prescribed by your surgeon, please contact your surgeon's office to let them know so they can address your concerns.      Today you received Toradol, an antiinflammatory medication similar to Ibuprofen.  You should not take other antiinflammatory medication, such as Ibuprofen, Motrin, Advil, Aleve, Naprosyn, etc until 7:43PM.

## 2024-12-17 NOTE — ANESTHESIA PROCEDURE NOTES
Airway       Patient location during procedure: OR       Procedure Start/Stop Times: 12/17/2024 1:03 PM  Staff -        CRNA: Kathy Mcdonald APRN CRNA       Performed By: CRNA  Consent for Airway        Urgency: elective  Indications and Patient Condition       Indications for airway management: vikram-procedural       Induction type:intravenous       Mask difficulty assessment: 1 - vent by mask    Final Airway Details       Final airway type: endotracheal airway       Successful airway: Oral and ETT - single  Endotracheal Airway Details        ETT size (mm): 8.0       Cuffed: yes       Successful intubation technique: direct laryngoscopy       DL Blade Type: MAC 4       Grade View of Cords: 1       Adjucts: stylet       Position: Right       Measured from: gums/teeth       Secured at (cm): 23       Bite block used: None    Post intubation assessment        Placement verified by: capnometry, equal breath sounds and chest rise        Number of attempts at approach: 1       Secured with: tape       Ease of procedure: easy       Dentition: Intact and Unchanged    Medication(s) Administered   Medication Administration Time: 12/17/2024 1:03 PM

## 2024-12-17 NOTE — ANESTHESIA POSTPROCEDURE EVALUATION
Patient: Angelo James    Procedure: Procedure(s):  APPENDECTOMY, LAPAROSCOPIC       Anesthesia Type:  General    Note:  Disposition: Outpatient   Postop Pain Control: Uneventful            Sign Out: Well controlled pain   PONV: No   Neuro/Psych: Uneventful            Sign Out: Acceptable/Baseline neuro status   Airway/Respiratory: Uneventful            Sign Out: Acceptable/Baseline resp. status   CV/Hemodynamics: Uneventful            Sign Out: Acceptable CV status; No obvious hypovolemia; No obvious fluid overload   Other NRE: NONE   DID A NON-ROUTINE EVENT OCCUR? No       Last vitals:  Vitals Value Taken Time   /90 12/17/24 1500   Temp 36.3  C (97.3  F) 12/17/24 1415   Pulse 63 12/17/24 1500   Resp 16 12/17/24 1500   SpO2 98 % 12/17/24 1500       Electronically Signed By: Devante Bello MD  December 17, 2024  5:00 PM

## 2024-12-17 NOTE — H&P
"[facility]  Surgical Consultants - H&P     Angelo James MRN# [id number]   Age: 34 year old YOB: 1990     HPI:  Angelo James  has been experiencing acute RLQ abdominal pain for the past 2 days associated with fever, chills, nausea, and anorexia.  These symptoms have been increasing in severity.   No prior abdominal surgeries.    History is obtained from the patient    Review Of Systems:  Respiratory: No shortness of breath, dyspnea on exertion, cough, or hemoptysis  Cardiovascular: negative  Gastrointestinal: as above  Genitourinary: negative    PMH:  No past medical history on file.    PSH:  No past surgical history on file.    Allergies:  No Known Allergies    Home Medications:  Current Outpatient Medications   Medication Sig Dispense Refill    HYDROcodone-acetaminophen (NORCO) 5-325 MG tablet Take 1-2 tablets by mouth every 4 hours as needed for moderate to severe pain. 10 tablet 0    methocarbamol (ROBAXIN) 750 MG tablet Take 1 tablet (750 mg) by mouth 4 times daily as needed for muscle spasms (pain). 15 tablet 0    senna-docusate (SENOKOT-S/PERICOLACE) 8.6-50 MG tablet Take 1-2 tablets by mouth 2 times daily as needed for constipation. 15 tablet 0       Social History:  Social History     Tobacco Use    Smoking status: Never    Smokeless tobacco: Never   Substance Use Topics    Alcohol use: Yes     Comment: 1 day per week 4 drinks    Drug use: No       Family History:  No family history chronic diarrhea, inflammatory bowel disease or colon cancer. Denies any personal or family history of bleeding issues or issues with general anesthesia.    Objective:  BP (!) 128/95   Pulse 68   Temp 97.8  F (36.6  C) (Temporal)   Resp 17   Ht 1.88 m (6' 2\")   Wt 97.5 kg (215 lb)   SpO2 98%   BMI 27.60 kg/m      General appearance: healthy, alert, and mild distress  Skin:  warm, dry  Neck: normal and supple  Lungs: normal, breathing comfortably on room air, clear speech  Heart: " RRR  Abdomen: flat, soft.   Tenderness: present: RLQ moderate  Masses: none  Organomegaly: none    Labs Reviewed:  Recent Labs     12/17/24  0545   HGB 14.1   WBC 7.7       Radiology:  CT abdomen reveals a dilated, thick-walled appendix with surrounding fat stranding.    All imaging studies reviewed by me.    ASSESSMENT/PLAN:  The patient's history, physical exam, laboratory and imaging studies are suspicious for acute appendicitis.  I have offered the patient laparoscopic appendectomy.  The risks, benefits, and alternatives have been discussed in detail.  All of the patient's questions have been answered.  They elect to proceed and we will go to the OR at the soonest availability.  Pre-operative antibiotics have been ordered.     Lauro Bolaños MD

## 2024-12-17 NOTE — ED TRIAGE NOTES
"Pt reports upper abdominal pain since Saturday which has moved down to lower abdomen with nausea. Denies vomiting or diarrhea. Pt states \"it feels like bubbling\".      Triage Assessment (Adult)       Row Name 12/17/24 9130          Respiratory WDL    Respiratory WDL WDL        Cardiac WDL    Cardiac WDL WDL        Cognitive/Neuro/Behavioral WDL    Cognitive/Neuro/Behavioral WDL WDL                     "

## 2024-12-17 NOTE — OR NURSING
Meets criteria for discharge.  Discharge instructions reviewed with pt and pt's designated responsible party.  Pt label on prescription bag from pharmacy matched to pt's wristband. Pharmacy bag opened with 3 prescriptions inside. Medications were reviewed to match pt wristband while pt and significant other agreed with identification. Prescriptions placed back in pharmacy bag resealed with tape and put in patients discharge bag per pt request.   chances of quitting for good. · Use a vaporizer or humidifier to add moisture to your bedroom. Follow the directions for cleaning the machine. When should you call for help? Call your doctor now or seek immediate medical care if:    · You have new or worse trouble swallowing.     · Your sore throat gets much worse on one side.    Watch closely for changes in your health, and be sure to contact your doctor if you do not get better as expected. Where can you learn more? Go to https://BlockSpring.[employer]. org and sign in to your "[employer] account. Enter B030 in the [employer] box to learn more about \"Sore Throat: Care Instructions. \"     If you do not have an account, please click on the \"Sign Up Now\" link. Current as of: July 28, 2019  Content Version: 12.3  © 8522-6262 [employer]. Care instructions adapted under license by [facility] ([facility]). If you have questions about a medical condition or this instruction, always ask your healthcare professional. [place] [address] any warranty or liability for your use of this information. Patient Education        Candidiasis: Care Instructions  Your Care Instructions  Candidiasis (say \"dci-irh-JG-uh-selvin\") is a yeast infection. Yeast normally lives in your body. But it can cause problems if your body's defenses don't work as they should. Some medicines can increase your chance of getting a yeast infection. These include antibiotics, steroids, and cancer drugs. And some diseases like AIDS and diabetes can make you more likely to get yeast infections. There are different types of yeast infections. Moy Porsche is a yeast infection in the mouth. It usually occurs in people with weak immune systems. It causes white patches inside the mouth and throat. Yeast infections of the skin usually occur in skin folds where the skin stays moist. They cause red, oozing patches on your skin. Babies can get these infections under the diaper. People who often wear gloves can get them on their hands. Many women get vaginal yeast infections. They are most common when women take antibiotics. These infections can cause the vagina to itch and burn. They also cause white discharge that looks like cottage cheese. In rare cases, yeast infects the blood. This can cause serious disease. This kind of infection is treated with medicine given through a needle into a vein (IV). After you start treatment, a yeast infection usually goes away quickly. But if your immune system is weak, the infection may come back. Tell your doctor if you get yeast infections often. Follow-up care is a key part of your treatment and safety. Be sure to make and go to all appointments, and call your doctor if you are having problems. It's also a good idea to know your test results and keep a list of the medicines you take. How can you care for yourself at home? · Take your medicines exactly as prescribed. Call your doctor if you think you are having a problem with your medicine. · Use antibiotics only as directed by your doctor. · Eat yogurt with live cultures. It has bacteria called lactobacillus. It may help prevent some types of yeast infections. · Keep your skin clean and dry. Put powder on moist places. · If you are using a cream or suppository to treat a vaginal yeast infection, don't use condoms or a diaphragm. Use a different type of birth control. · Eat a healthy diet and get regular exercise. This will help keep your immune system strong. When should you call for help? Watch closely for changes in your health, and be sure to contact your doctor if:    · You do not get better as expected. Where can you learn more? Go to https://Quraterteresa.[employer]. org and sign in to your [employer] account. Enter L678 in the [facility] box to learn more about \"Candidiasis: Care Instructions. \"     If you do not have an account, please click on the \"Sign Up Now\" link.  Current as of: February 19, 2019  Content Version: 12.3  © 8098-6748 [employer], [employer]. Care instructions adapted under license by [facility] ([facility]). If you have questions about a medical condition or this instruction, always ask your healthcare professional. [address] any warranty or liability for your use of this information. 1.)Strep test in office negative. Take clotrimazole lozenges as prescribed for possible thrush. May also take tylenol/motrin as needed for symptoms  2.) If symptoms persist despite treatment for thrush please call and can send in an antibiotic for pharyngitis  3. )If symptoms worsen or do not improve please follow-up with PCP or return to clinic

## 2024-12-17 NOTE — ANESTHESIA CARE TRANSFER NOTE
Patient: Angelo James    Procedure: Procedure(s):  APPENDECTOMY, LAPAROSCOPIC       Diagnosis: Acute appendicitis with localized peritonitis, without perforation, abscess, or gangrene [K35.30]  Diagnosis Additional Information: No value filed.    Anesthesia Type:   General     Note:    Oropharynx: oropharynx clear of all foreign objects and spontaneously breathing  Level of Consciousness: awake  Oxygen Supplementation: face mask and blow-by O2    Independent Airway: airway patency satisfactory and stable  Dentition: dentition unchanged  Vital Signs Stable: post-procedure vital signs reviewed and stable  Report to RN Given: handoff report given  Patient transferred to: PACU    Handoff Report: Identifed the Patient, Identified the Reponsible Provider, Reviewed the pertinent medical history, Discussed the surgical course, Reviewed Intra-OP anesthesia mangement and issues during anesthesia, Set expectations for post-procedure period and Allowed opportunity for questions and acknowledgement of understanding      Vitals:  Vitals Value Taken Time   /88    Temp 98    Pulse 87    Resp 15    SpO2 100        Electronically Signed By: JONAH Wagner CRNA  December 17, 2024  2:15 PM

## 2024-12-17 NOTE — OP NOTE
General Surgery Operative Note    PREOPERATIVE DIAGNOSIS:  Acute appendicitis      POSTOPERATIVE DIAGNOSIS:  Acute non perforated appendicitis     PROCEDURE: LAPAROSCOPIC APPENDECTOMY     ANESTHESIA:  General    SURGEON:  Lauro Bolaños MD    ASSISTANT:  Colleen Nicole PA-C assisted in the above procedure. They provided assistance with pre-operative positioning, prepping, and draping of the patient. The assistant provided vital operative assistance with retraction using instruments thus providing the necessary exposure and visualization for the case, manipulation of tissues to achieve hemostasis, suction for visualization and assisted in closure of the wound. Post-operatively they assisted in transfer of the patient off the operative table and transition to the PACU physicians.    INDICATIONS:  Angelo James is a 34 year old male who presented to the Emergency Department with complaints of abdominal pain. The patient underwent a work-up including physical exam, abdominal CT scan and labs which all were most consistent with acute appendicitis. The decision was made for the patient to proceed to the operating room for laparoscopic removal of the appendix. The risks and benefits of the procedure were discussed with the patient and consent for the procedure was obtained.     PROCEDURE: The patient was brought to the preoperative area and preoperative antibiotics were administered. The patient was brought back to the operating room and placed in supine position on the operating table. The anesthesia was induced, and the patient was intubated. The patient was secured to the operating table and their pressure points were padded. The patient's abdomen was prepped and draped in sterile fashion. A time out was completed confirming patient, procedure, site of surgery and any special equipment needed for the procedure.     Following infiltration with local anesthetic, the 11 blade scalpel was used to made a small incision in  the patient's left upper quadrant. Entrance into the abdomen was then gained under direct visualization using the 5 mm scope with the 5 mm Visiport. When the abdomen had been safely entered, it was insufflated and the scope reinserted. Initial inspection revealed no evidence of injury at the port entry site. Under direct visualization, two additional ports were placed, one 12 mm left lateral port, and one 5 mm left lower quadrant port. Graspers were inserted, and the patient's appendix was identified.     The appendix was freed from surrounding tissue using the m[place] ligasure. The appendix appeared mildly dilated and inflamed and retrocecal. There was no evidence of rupture. When the appendix had been freed, the [place] dissector was used to make a window at the base of the appendix. The laparoscopic stapler was brought into the field and inserted within the abdomen, and the appendix was transected at the base using a single firing of the blue load of the Endo ISIDRO stapler. The mesoappendix was taken using [place] ligasure. When the appendix was then completely dissected free, it was placed in an Endo catch bag and removed through the patient's left lower quadrant port site. The ports were reinserted and the patient's right lower quadrant was inspected. The staple line along the cecum was found to be intact without evidence of bleeding. There was no bleeding from the mesoappendix.   Attention was then turned to the patient's left lateral. The 12 mm port was removed and there was no evidence of bleeding at the port exit site.  The fascia at the port site was reapproximated using an 0 Vicryl stitch and the Cristopher-Bony fascial closure device. The patient's left lower quadrant port was then removed under direct visualization. There was no evidence of bleeding at the port exit site. The patient's abdomen was then allowed to desufflate fully. The port sites were inspected, and hemostasis was obtained using  electrocautery. The port sites were then reapproximated using 4-0 Monocryl subcuticular stitches. The incisions were washed and dried, and sterile dressings were applied. The lap, needle and instrument counts were correct at the end of the procedure. The patient tolerated the procedure well and was extubated and taken to the recovery area in stable condition.     ESTIMATED BLOOD LOSS: 5cc    INTRAOPERATIVE FINDINGS:  Acute appendicitis without evidence of rupture.     SPECIMENS: Appendix     DRAINS: None    CONDITION: The patient will be discharged to home directly from the Postanesthesia Care Unit with instructions for postoperative cares and medications for pain management. They will follow up in the Surgery Clinic in two to three weeks' time for postoperative monitoring.     Lauro Bolaños MD

## 2024-12-17 NOTE — ANESTHESIA PREPROCEDURE EVALUATION
Anesthesia Pre-Procedure Evaluation    Patient: Angelo James   MRN: [id number] : 1990        Procedure : Procedure(s):  APPENDECTOMY, LAPAROSCOPIC          No past medical history on file.   No past surgical history on file.   No Known Allergies   Social History     Tobacco Use     Smoking status: Never     Smokeless tobacco: Never   Substance Use Topics     Alcohol use: Yes     Comment: 1 day per week 4 drinks      Wt Readings from Last 1 Encounters:   24 97.5 kg (215 lb)        Anesthesia Evaluation   Pt has had prior anesthetic. Type: MAC.    No history of anesthetic complications       ROS/MED HX  ENT/Pulmonary:    (-) tobacco use, asthma, COPD and sleep apnea   Neurologic:  - neg neurologic ROS     Cardiovascular:  - neg cardiovascular ROS     METS/Exercise Tolerance:     Hematologic:       Musculoskeletal:       GI/Hepatic: Comment: Boaz Barr Virus    (+)         appendicitis,    liver disease,       Renal/Genitourinary:    (-) renal disease   Endo:    (-) Type I DM and Type II DM   Psychiatric/Substance Use:     (+) psychiatric history anxiety and depression       Infectious Disease:       Malignancy:       Other:            Physical Exam    Airway        Mallampati: II   TM distance: > 3 FB   Neck ROM: full   Mouth opening: > 3 cm    Respiratory Devices and Support         Dental       (+) Minor Abnormalities - some fillings, tiny chips      Cardiovascular          Rhythm and rate: regular and normal     Pulmonary   pulmonary exam normal            OUTSIDE LABS:  CBC:   Lab Results   Component Value Date    WBC 7.7 2024    WBC 9.0 2021    HGB 14.1 2024    HGB 12.0 (L) 2021    HCT 41.9 2024    HCT 36.9 (L) 2021     2024     2021     BMP:   Lab Results   Component Value Date     2024     (L) 2021    POTASSIUM 4.1 2024    POTASSIUM 4.5 2021    CHLORIDE 102 2024    CHLORIDE 101  "02/13/2021    CO2 25 12/17/2024    CO2 29 02/13/2021    BUN 15.3 12/17/2024    BUN 9 02/13/2021    CR 1.01 12/17/2024    CR 1.35 (H) 02/13/2021     (H) 12/17/2024     (H) 02/13/2021     COAGS:   Lab Results   Component Value Date    INR 1.28 (H) 02/13/2021     POC: No results found for: \"BGM\", \"HCG\", \"HCGS\"  HEPATIC:   Lab Results   Component Value Date    ALBUMIN 4.4 12/17/2024    PROTTOTAL 7.2 12/17/2024    ALT 27 12/17/2024    AST 19 12/17/2024    ALKPHOS 70 12/17/2024    BILITOTAL 0.3 12/17/2024     OTHER:   Lab Results   Component Value Date    LACT 1.3 02/07/2021    AME 9.3 12/17/2024    LIPASE 28 12/17/2024       Anesthesia Plan    ASA Status:  2, emergent    NPO Status:  ELEVATED Aspiration Risk/Unknown    Anesthesia Type: General.     - Airway: ETT   Induction: Intravenous.   Maintenance: Inhalation.        Consents    Anesthesia Plan(s) and associated risks, benefits, and realistic alternatives discussed. Questions answered and patient/representative(s) expressed understanding.     - Discussed:     - Discussed with:  Patient            Postoperative Care    Pain management: Multi-modal analgesia.   PONV prophylaxis: Ondansetron (or other 5HT-3), Dexamethasone or Solumedrol, Background Propofol Infusion     Comments:               Devante Bello MD    I have reviewed the pertinent notes and labs in the chart from the past 30 days and (re)examined the patient.  Any updates or changes from those notes are reflected in this note.                         # Overweight: Estimated body mass index is 27.6 kg/m  as calculated from the following:    Height as of this encounter: 1.88 m (6' 2\").    Weight as of this encounter: 97.5 kg (215 lb).             "

## 2024-12-17 NOTE — PHARMACY-ADMISSION MEDICATION HISTORY
Pharmacist Admission Medication History    Admission medication history is complete. The information provided in this note is only as accurate as the sources available at the time of the update.    Information Source(s): Patient, Hospital records, and CareEverywhere/SureScripts via in-person    Pertinent Information:    - Patient reports he usually only takes his Adderall about 4x/week    Changes made to PTA medication list:  Added: Multivitamin  Deleted: None  Changed: Adderall added sig      Medication History Completed By: Benjamin Carpio RPH 12/17/2024 10:25 AM    PTA Med List   Medication Sig Last Dose/Taking    amphetamine-dextroamphetamine (ADDERALL XR) 25 MG 24 hr capsule Take 25 mg by mouth daily. Usually only takes 4x/week Past Week    multivitamin, therapeutic (THERA-VIT) TABS tablet Take 1 tablet by mouth daily. 12/16/2024

## 2024-12-19 ENCOUNTER — TELEPHONE (OUTPATIENT)
Dept: SURGERY | Facility: CLINIC | Age: 34
End: 2024-12-19
Payer: COMMERCIAL

## 2024-12-19 DIAGNOSIS — G89.18 POSTOPERATIVE PAIN: Primary | ICD-10-CM

## 2024-12-19 DIAGNOSIS — K35.30 ACUTE APPENDICITIS WITH LOCALIZED PERITONITIS, WITHOUT PERFORATION, ABSCESS, OR GANGRENE: ICD-10-CM

## 2024-12-19 LAB
PATH REPORT.COMMENTS IMP SPEC: NORMAL
PATH REPORT.COMMENTS IMP SPEC: NORMAL
PATH REPORT.FINAL DX SPEC: NORMAL
PATH REPORT.GROSS SPEC: NORMAL
PATH REPORT.MICROSCOPIC SPEC OTHER STN: NORMAL
PATH REPORT.RELEVANT HX SPEC: NORMAL
PHOTO IMAGE: NORMAL

## 2024-12-19 PROCEDURE — 88304 TISSUE EXAM BY PATHOLOGIST: CPT | Mod: 26 | Performed by: PATHOLOGY

## 2024-12-19 RX ORDER — OXYCODONE HYDROCHLORIDE 5 MG/1
2.5-5 TABLET ORAL EVERY 6 HOURS PRN
Qty: 8 TABLET | Refills: 0 | Status: SHIPPED | OUTPATIENT
Start: 2024-12-19 | End: 2024-12-22

## 2024-12-19 RX ORDER — METHOCARBAMOL 750 MG/1
750 TABLET, FILM COATED ORAL 4 TIMES DAILY PRN
Qty: 15 TABLET | Refills: 0 | Status: SHIPPED | OUTPATIENT
Start: 2024-12-19

## 2024-12-19 NOTE — TELEPHONE ENCOUNTER
Name of caller: Patient    Reason for Call: Refill and having pain    Surgeon:  Seven Trimble MD    Recent Surgery:  Yes.    If yes, when & what type:    12/17/24- ED  Acute appendectomy w/ localized periontitis perforation, abscess or gangrene      Best phone number to reach pt at is:   [phone]    Ok to leave a message with medical info? Yes.    Pharmacy preferred (if calling for a refill): [facility] in [place] on [address]

## 2024-12-19 NOTE — TELEPHONE ENCOUNTER
Called patient and informed him that pain medication has been changed to oxycodone.  Recommended the following pain management regimen:    - tylenol 1000 mg every 6 hours  - ibuprofen 600 mg every 6 hours  - robaxin 4 times daily  - oxycodone for breakthrough pain    Alternate between the tylenol and ibuprofen so that he is taking something every 3 hours    He verbalized understanding of this and will call PRN. He is aware that if he feels the need for any additional pain meds after this, he will need to be seen in clinic first for evaluation    Adriane Hampton RN-BSN

## 2024-12-19 NOTE — TELEPHONE ENCOUNTER
Procedure: laparoscopic appendectomy    Date: 12/17/24    Surgeon: Miky    Patient looking for refill of norco. He reports he has #2 left.  Also using robaxin 3 - 4 times per day #9 left of Robaxin    Has not been using ibuprofen. He will purchase some when he picks up refill of pain med. Will also  miralax to add in with the senokot as he has not yet had a bowel movement    Did also suggest he wear an abdominal binder if he has one, as this can help support his abdominal muscles while he continues to heal, as it sounds like majority of pain right now is muscular based, with movement, as well as incision.     Urinating without issue.    Will call him back once rx sent to his pharmacy. Did inform him that narcotic might be changed to oxycodone so that he can take tylenol, in addition to the other medications    He verbalizes understanding and will wait for call back once prescription(s) sent.    Adriane Hampton RN-BSN

## 2024-12-19 NOTE — TELEPHONE ENCOUNTER
Attempted to call patient. No answer. Left message informing him that I will try calling back later.    Adriane Hampton, RN-BSN

## 2024-12-26 ENCOUNTER — OFFICE VISIT (OUTPATIENT)
Dept: SURGERY | Facility: CLINIC | Age: 34
End: 2024-12-26
Payer: COMMERCIAL

## 2024-12-26 ENCOUNTER — TELEPHONE (OUTPATIENT)
Dept: SURGERY | Facility: CLINIC | Age: 34
End: 2024-12-26

## 2024-12-26 ENCOUNTER — LAB (OUTPATIENT)
Dept: LAB | Facility: CLINIC | Age: 34
End: 2024-12-26
Payer: COMMERCIAL

## 2024-12-26 DIAGNOSIS — K35.30 ACUTE APPENDICITIS WITH LOCALIZED PERITONITIS, WITHOUT PERFORATION, ABSCESS, OR GANGRENE: ICD-10-CM

## 2024-12-26 DIAGNOSIS — R10.30 LOWER ABDOMINAL PAIN: ICD-10-CM

## 2024-12-26 DIAGNOSIS — G89.18 POSTOPERATIVE PAIN: ICD-10-CM

## 2024-12-26 DIAGNOSIS — G89.18 POSTOPERATIVE PAIN: Primary | ICD-10-CM

## 2024-12-26 LAB
ABO + RH BLD: NORMAL
ALBUMIN UR-MCNC: NEGATIVE MG/DL
ANION GAP SERPL CALCULATED.3IONS-SCNC: 9 MMOL/L (ref 7–15)
APPEARANCE UR: CLEAR
BILIRUB UR QL STRIP: NEGATIVE
BUN SERPL-MCNC: 18 MG/DL (ref 6–20)
CALCIUM SERPL-MCNC: 9.8 MG/DL (ref 8.8–10.4)
CHLORIDE SERPL-SCNC: 103 MMOL/L (ref 98–107)
COLOR UR AUTO: ABNORMAL
CREAT SERPL-MCNC: 1.08 MG/DL (ref 0.67–1.17)
EGFRCR SERPLBLD CKD-EPI 2021: >90 ML/MIN/1.73M2
ERYTHROCYTE [DISTWIDTH] IN BLOOD BY AUTOMATED COUNT: 12.2 % (ref 10–15)
GLUCOSE SERPL-MCNC: 73 MG/DL (ref 70–99)
GLUCOSE UR STRIP-MCNC: NEGATIVE MG/DL
HCO3 SERPL-SCNC: 27 MMOL/L (ref 22–29)
HCT VFR BLD AUTO: 41.9 % (ref 40–53)
HGB BLD-MCNC: 13.9 G/DL (ref 13.3–17.7)
HGB UR QL STRIP: NEGATIVE
KETONES UR STRIP-MCNC: NEGATIVE MG/DL
LEUKOCYTE ESTERASE UR QL STRIP: NEGATIVE
MCH RBC QN AUTO: 30.2 PG (ref 26.5–33)
MCHC RBC AUTO-ENTMCNC: 33.2 G/DL (ref 31.5–36.5)
MCV RBC AUTO: 91 FL (ref 78–100)
MUCOUS THREADS #/AREA URNS LPF: PRESENT /LPF
NITRATE UR QL: NEGATIVE
PH UR STRIP: 6 [PH] (ref 5–7)
PLATELET # BLD AUTO: 301 10E3/UL (ref 150–450)
POTASSIUM SERPL-SCNC: 4.3 MMOL/L (ref 3.4–5.3)
RBC # BLD AUTO: 4.61 10E6/UL (ref 4.4–5.9)
RBC URINE: 0 /HPF
SODIUM SERPL-SCNC: 139 MMOL/L (ref 135–145)
SP GR UR STRIP: 1.02 (ref 1–1.03)
SPECIMEN EXP DATE BLD: NORMAL
UROBILINOGEN UR STRIP-MCNC: NORMAL MG/DL
WBC # BLD AUTO: 9 10E3/UL (ref 4–11)
WBC URINE: <1 /HPF

## 2024-12-26 PROCEDURE — 82374 ASSAY BLOOD CARBON DIOXIDE: CPT

## 2024-12-26 PROCEDURE — 85027 COMPLETE CBC AUTOMATED: CPT

## 2024-12-26 PROCEDURE — 36415 COLL VENOUS BLD VENIPUNCTURE: CPT

## 2024-12-26 PROCEDURE — 80048 BASIC METABOLIC PNL TOTAL CA: CPT

## 2024-12-26 PROCEDURE — 81001 URINALYSIS AUTO W/SCOPE: CPT

## 2024-12-26 PROCEDURE — 86901 BLOOD TYPING SEROLOGIC RH(D): CPT

## 2024-12-26 NOTE — TELEPHONE ENCOUNTER
Patient had a lap appy  12/17/24 CY. He is having some acute pain and aches low in the abdomen    Please call    Phone: [phone]   Message ok

## 2024-12-26 NOTE — TELEPHONE ENCOUNTER
"Procedure: Laparoscopic appendectomy    Date: 12/17/24    Surgeon: Miky    Patient calling to discuss pain he is having in his abdomen.     He reports some sharper pains, deeper in abdomen, which he assumes is post op/healing related.    However, he is also having \"really bad stomach aches\" Lower abdominal pain that radiates into his scrotum. Dull pain.  Significant amount of pain. Started a few days ago    This pain does not feel muscular. It is intermittent. Usually at night time, but started this morning about 30 minutes after he woke up    He is urinating okay and having bowel movements.     No n/v, fever    Only using ibuprofen for pain at this point    Informed him that I will discuss these symptoms with Dr. Bolaños or on call provider team and call him back with recommendations.  May want labs to make sure post op infection is not developing.    He is in agreement with this plan and will wait for call back    Adriane Hampton RN-BSN            "

## 2024-12-26 NOTE — PROGRESS NOTES
General Surgery Post-op Follow up Clinic Note:      Angelo James is a 34 year old who is following up in clinic 9 days s/p laparoscopic appendectomy on 12/17/2024.    S: Patient reports he is doing okay.  His pain has improved but he continues to have a lower abdominal pressure pain for about an hour in the morning and in the evenings.  He takes ibuprofen and rest which often helps.  He reports that he is eating well and having good bowel movements.  He denies any pain with bowel movements or with voiding. He denies any fevers or night sweats.    Abdomen: incisions clean, dry, intact with Steri-Strips.  No erythema.  No purulent discharge.  Mild tenderness to palpation around incision sites.  Mild tenderness to palpation of the lower abdomen.  No rebound.  No peritonitis.    Pathology:   Final Diagnosis   Appendix, appendectomy:  -- Rare neutrophils present, suggestive of early/evolving acute appendicitis.       CBC within normal limits.  No leukocytosis  UA within normal limits.  No leukoesterase or nitrite.  BMP WNL    A/P:  Angelo James is recovering from a Laparoscopic Appendectomy. I advised him to slowly return to regular activity. I expect he will make a complete recovery without issues. We reviewed his pathology today as well.      His lab work up has been WNL. This pain is likely post-operative without any concerns of infection at this point. I encouraged him to call back in a week or 2 if he continues to have this ongoing lower abdominal pain which then we will proceed with CT scan of the abdomen pelvis.  If he was to have any worsening abdominal pain, nausea, vomiting, or fevers, he is to call sooner for a more urgent workup including CT abdomen pelvis.    Thank you for the opportunity to help in their care.    Lauro Bolaños MD   [facility]  [phone]    Please route or send letter to:  Primary Care Provider (PCP) and Referring Provider

## 2024-12-26 NOTE — TELEPHONE ENCOUNTER
Contacted patient with provider recommendations:    - UA  - CBC, BMP  - Follow up in clinic after    Lab scheduled for 10:00 am. Appt to follow. Directions provided    He is in agreement with this plan    Adriane Hampton RN-BSN

## 2025-01-02 NOTE — DISCHARGE SUMMARY
"Surgery Discharge Summary    Angelo James MRN# [id number]   YOB: 1990 Age: 34 year old     Date of Admission:  12/17/2024  Date of Discharge:  12/17/2024  Admitting Physician:  Lauro Bolaños MD  Discharging Service:  [facility] General Surgery   Primary Provider: Viry Marques    Discharge Diagnosis:   Principle Diagnosis:  Acute appendicitis with localized peritonitis, without perforation, abscess, or gangrene [K35.30]    Secondary Diagnosis:  Same    Brief HPI: Angelo James is a 34 year old year-old male who presented to the ED for abdominal pain. Work up was most consistent with Acute appendicitis.     Hospital Course: Angelo James underwent the above named procedure without complications. Please see op note for further details. The patient had a routine hospital course and recovered as anticipated. He had advanced to a regular diet and was transitioned successfully to oral pain medications. He was  ambulating independently and voiding spontaneously. By day of discharge, he remained afebrile, was tolerating an oral diet, had adequate pain control on oral medications and was ambulating independently thus medically appropriate for discharge to home. He will follow-up with us in two weeks was advised to call sooner with any questions or concerns.     Inpatient Consultations: No consultations were requested during this admission    Procedures:   Laparoscopic appendectomy    Disposition:   Discharged to home     Discharge Condition  Discharge condition: Stable   Discharge vitals: Blood pressure 105/77, pulse 65, temperature 97.1  F (36.2  C), temperature source Temporal, resp. rate 16, height 1.88 m (6' 2\"), weight 97.5 kg (215 lb), SpO2 97%.     Discharge Medications:   Discharge Medication List as of 12/17/2024  2:56 PM        START taking these medications    Details   senna-docusate (SENOKOT-S/PERICOLACE) 8.6-50 MG tablet Take 1-2 tablets by mouth 2 times daily as needed for " constipation., Disp-15 tablet, R-0, E-Prescribe      HYDROcodone-acetaminophen (NORCO) 5-325 MG tablet Take 1-2 tablets by mouth every 4 hours as needed for moderate to severe pain., Disp-10 tablet, R-0, E-Prescribe      methocarbamol (ROBAXIN) 750 MG tablet Take 1 tablet (750 mg) by mouth 4 times daily as needed for muscle spasms (pain)., Disp-15 tablet, R-0, E-Prescribe           CONTINUE these medications which have NOT CHANGED    Details   amphetamine-dextroamphetamine (ADDERALL XR) 25 MG 24 hr capsule Take 25 mg by mouth daily. Usually only takes 4x/week, Historical      multivitamin, therapeutic (THERA-VIT) TABS tablet Take 1 tablet by mouth daily., Historical             Discharge Instructions:     After Care Instructions       Discharge Instructions - Follow-up Appointment (Days)      See discharge instruction sheet                    Lauro Bolaños MD

## 2025-01-15 ENCOUNTER — MYC MEDICAL ADVICE (OUTPATIENT)
Dept: SURGERY | Facility: CLINIC | Age: 35
End: 2025-01-15
Payer: COMMERCIAL

## 2025-01-15 ENCOUNTER — HOSPITAL ENCOUNTER (OUTPATIENT)
Dept: CT IMAGING | Facility: CLINIC | Age: 35
Discharge: HOME | End: 2025-01-15
Attending: STUDENT IN AN ORGANIZED HEALTH CARE EDUCATION/TRAINING PROGRAM
Payer: COMMERCIAL

## 2025-01-15 DIAGNOSIS — Z90.49 S/P APPENDECTOMY: ICD-10-CM

## 2025-01-15 DIAGNOSIS — R10.30 LOWER ABDOMINAL PAIN: ICD-10-CM

## 2025-01-15 DIAGNOSIS — R10.30 LOWER ABDOMINAL PAIN: Primary | ICD-10-CM

## 2025-01-15 PROCEDURE — 250N000009 HC RX 250: Performed by: STUDENT IN AN ORGANIZED HEALTH CARE EDUCATION/TRAINING PROGRAM

## 2025-01-15 PROCEDURE — 250N000011 HC RX IP 250 OP 636: Performed by: STUDENT IN AN ORGANIZED HEALTH CARE EDUCATION/TRAINING PROGRAM

## 2025-01-15 PROCEDURE — 74177 CT ABD & PELVIS W/CONTRAST: CPT

## 2025-01-15 RX ORDER — IOPAMIDOL 755 MG/ML
106 INJECTION, SOLUTION INTRAVASCULAR ONCE
Status: COMPLETED | OUTPATIENT
Start: 2025-01-15 | End: 2025-01-15

## 2025-01-15 RX ADMIN — IOPAMIDOL 106 ML: 755 INJECTION, SOLUTION INTRAVENOUS at 10:43

## 2025-01-15 RX ADMIN — SODIUM CHLORIDE 70 ML: 9 INJECTION, SOLUTION INTRAVENOUS at 10:43

## 2025-01-15 NOTE — ADDENDUM NOTE
Addended by: PALAK ACEVEDO on: 1/15/2025 04:19 PM     Modules accepted: Orders, Level of Service

## 2025-01-15 NOTE — TELEPHONE ENCOUNTER
General Surgery Telephone Visit    Angelo James is a 34 year old male who underwent a standard Lap appendectomy on 12/17/2024. Since surgery he had recovered well from his infection but had intermittently developed lower abdominal pain. The pain has been achy. It had intermittently improved since our last visit but returned over the past 2 days.  It often starts around 2 or 2:30 AM in his lower pelvis.  It is currently a severe achy feeling that is not improving with him laying down.  They have not tried any medication today.  He has briefly tried ibuprofen that has helped.  He denies any trouble eating.  He denies any abdominal distention.He has 1-2 bowel movements a day.  They are nonbloody.  He denies any abdominal pain with bowel movements.  He denies any burning or pain with urination.  He denies any discoloration with urination or any discharge.  His incisions have healed well and are nonpainful.  He denies any family history of Crohn's disease or ulcerative colitis.  He does report that his family has been sick for the past couple days also but he does not exhibit any similar symptoms as some.    He underwent a CT scan today that was overall unremarkable.  There is no evidence of fluid collection or occult hernias on CT.  There is no inflammation of his bowels.  No evidence of large stool burden to indicate constipation.  No hematomas.  No evidence of inflammation of the bladder.    It is unclear what the cause of his lower abdominal pain is.  I would like him to get a set of labs to ensure no evidence of any infection. I at this point I recommend he take some ibuprofen for pain control and follow-up with his PCP if his pain continues for further workup.  Patient voiced understanding and is agreeable to the plan.    Lauro Bolaños MD

## 2025-01-15 NOTE — LETTER
Angelo James was seen and treated in our clinic on 1/15/2025.  He may return to work 1/16/25.    If you have any questions or concerns, please don't hesitate to call.      Lauro Bolaños MD  General Surgery  [facility]   [address] [place] [address], [address] [place] [address], [id number] [phone]

## (undated) DEVICE — BLADE CLIPPER 4406

## (undated) DEVICE — LINEN TOWEL PACK X5 5464

## (undated) DEVICE — GLOVE BIOGEL PI MICRO INDICATOR UNDERGLOVE SZ 6.5 48965

## (undated) DEVICE — ENDO TROCAR SLEEVE KII Z-THREADED 05X100MM CTS02

## (undated) DEVICE — ENDO POUCH UNIV RETRIEVAL SYSTEM INZII 10MM CD001

## (undated) DEVICE — ENDO TROCAR FIRST ENTRY KII FIOS Z-THRD 05X100MM CTF03

## (undated) DEVICE — SU VICRYL 0 UR-6 27" J603H

## (undated) DEVICE — ESU HOLDER LAP INST DISP PURPLE LONG 330MM H-PRO-330

## (undated) DEVICE — SOL WATER IRRIG 1000ML BOTTLE 2F7114

## (undated) DEVICE — DEVICE SUTURE PASSER 14GA WECK EFX EFXSP2

## (undated) DEVICE — PREP CHLORAPREP 26ML TINTED HI-LITE ORANGE 930815

## (undated) DEVICE — STPL POWERED ECHELON 45MM PSEE45A

## (undated) DEVICE — ENDO SCOPE WARMER LF TM500

## (undated) DEVICE — SU MONOCRYL 4-0 PS-2 18" UND Y496G

## (undated) DEVICE — ESU GROUND PAD UNIVERSAL W/O CORD

## (undated) DEVICE — STPL RELOAD REG TISSUE ECHELON 45 X 3.6MM BLUE GST45B

## (undated) DEVICE — NEEDLE HYPO MONOJECT STANDARD 22GA 1 1/2IN BLUE 1188822112

## (undated) DEVICE — ESU LIGASURE MARYLAND LAPAROSCOPIC SLR/DVDR 5MMX37CM LF1937

## (undated) DEVICE — ENDO TROCAR FIRST ENTRY KII FIOS Z-THRD 12X100MM CTF73

## (undated) DEVICE — GLOVE BIOGEL PI MICRO SZ 6.5 48565

## (undated) DEVICE — PACK LAP CHOLE SLC15LCFSD

## (undated) RX ORDER — KETOROLAC TROMETHAMINE 30 MG/ML
INJECTION, SOLUTION INTRAMUSCULAR; INTRAVENOUS
Status: DISPENSED
Start: 2024-12-17

## (undated) RX ORDER — HYDROMORPHONE HYDROCHLORIDE 1 MG/ML
INJECTION, SOLUTION INTRAMUSCULAR; INTRAVENOUS; SUBCUTANEOUS
Status: DISPENSED
Start: 2024-12-17

## (undated) RX ORDER — FENTANYL CITRATE 50 UG/ML
INJECTION, SOLUTION INTRAMUSCULAR; INTRAVENOUS
Status: DISPENSED
Start: 2024-12-17

## (undated) RX ORDER — BUPIVACAINE HYDROCHLORIDE AND EPINEPHRINE 5; 5 MG/ML; UG/ML
INJECTION, SOLUTION EPIDURAL; INTRACAUDAL; PERINEURAL
Status: DISPENSED
Start: 2024-12-17